# Patient Record
Sex: MALE | Race: WHITE | Employment: OTHER | URBAN - METROPOLITAN AREA
[De-identification: names, ages, dates, MRNs, and addresses within clinical notes are randomized per-mention and may not be internally consistent; named-entity substitution may affect disease eponyms.]

---

## 2017-01-13 PROBLEM — I48.92 ATRIAL FLUTTER, PAROXYSMAL (HCC): Status: ACTIVE | Noted: 2017-01-13

## 2017-04-10 PROBLEM — E78.00 HYPERCHOLESTEROLEMIA: Status: ACTIVE | Noted: 2017-04-10

## 2017-04-10 PROBLEM — I10 ESSENTIAL HYPERTENSION: Status: ACTIVE | Noted: 2017-04-10

## 2017-04-10 PROCEDURE — 36415 COLL VENOUS BLD VENIPUNCTURE: CPT | Performed by: INTERNAL MEDICINE

## 2017-04-10 PROCEDURE — 85025 COMPLETE CBC W/AUTO DIFF WBC: CPT | Performed by: INTERNAL MEDICINE

## 2017-10-12 PROBLEM — E55.9 VITAMIN D DEFICIENCY: Status: ACTIVE | Noted: 2017-10-12

## 2017-12-14 PROCEDURE — 81003 URINALYSIS AUTO W/O SCOPE: CPT | Performed by: INTERNAL MEDICINE

## 2018-05-03 ENCOUNTER — HOSPITAL ENCOUNTER (OUTPATIENT)
Dept: INTERVENTIONAL RADIOLOGY/VASCULAR | Facility: HOSPITAL | Age: 63
Discharge: HOME | End: 2018-05-03
Attending: INTERNAL MEDICINE | Admitting: INTERNAL MEDICINE
Payer: COMMERCIAL

## 2018-05-03 VITALS
SYSTOLIC BLOOD PRESSURE: 108 MMHG | DIASTOLIC BLOOD PRESSURE: 81 MMHG | WEIGHT: 184 LBS | HEIGHT: 70 IN | BODY MASS INDEX: 26.34 KG/M2 | RESPIRATION RATE: 15 BRPM | HEART RATE: 58 BPM | OXYGEN SATURATION: 94 %

## 2018-05-03 DIAGNOSIS — Z01.810 PRE-OPERATIVE CARDIOVASCULAR EXAMINATION: ICD-10-CM

## 2018-05-03 DIAGNOSIS — I35.0 AORTIC VALVE STENOSIS: ICD-10-CM

## 2018-05-03 PROCEDURE — B2111ZZ FLUOROSCOPY OF MULTIPLE CORONARY ARTERIES USING LOW OSMOLAR CONTRAST: ICD-10-PCS | Performed by: INTERNAL MEDICINE

## 2018-05-03 PROCEDURE — 80048 BASIC METABOLIC PNL TOTAL CA: CPT | Performed by: INTERNAL MEDICINE

## 2018-05-03 PROCEDURE — B2151ZZ FLUOROSCOPY OF LEFT HEART USING LOW OSMOLAR CONTRAST: ICD-10-PCS | Performed by: INTERNAL MEDICINE

## 2018-05-03 PROCEDURE — 85027 COMPLETE CBC AUTOMATED: CPT | Performed by: INTERNAL MEDICINE

## 2018-05-03 PROCEDURE — 99152 MOD SED SAME PHYS/QHP 5/>YRS: CPT

## 2018-05-03 PROCEDURE — 93460 R&L HRT ART/VENTRICLE ANGIO: CPT

## 2018-05-03 PROCEDURE — B2141ZZ FLUOROSCOPY OF RIGHT HEART USING LOW OSMOLAR CONTRAST: ICD-10-PCS | Performed by: INTERNAL MEDICINE

## 2018-05-03 PROCEDURE — 4A023N8 MEASUREMENT OF CARDIAC SAMPLING AND PRESSURE, BILATERAL, PERCUTANEOUS APPROACH: ICD-10-PCS | Performed by: INTERNAL MEDICINE

## 2018-05-03 RX ORDER — MIDAZOLAM HYDROCHLORIDE 1 MG/ML
INJECTION INTRAMUSCULAR; INTRAVENOUS
Status: COMPLETED
Start: 2018-05-03 | End: 2018-05-03

## 2018-05-03 RX ORDER — SODIUM CHLORIDE 9 MG/ML
INJECTION, SOLUTION INTRAVENOUS CONTINUOUS
Status: DISCONTINUED | OUTPATIENT
Start: 2018-05-03 | End: 2018-05-03

## 2018-05-03 RX ORDER — SODIUM CHLORIDE 9 MG/ML
INJECTION, SOLUTION INTRAVENOUS
Status: DISCONTINUED
Start: 2018-05-03 | End: 2018-05-03

## 2018-05-03 RX ADMIN — SODIUM CHLORIDE: 9 INJECTION, SOLUTION INTRAVENOUS at 09:09:00

## 2018-05-03 NOTE — DISCHARGE SUMMARY
Admitted for elective right/left heart cath for aortic stenosis. Severe AS, mild pulm HTN, normal coronaries. Follow-up with Dr Mary Jo Larkin and CV surgery.

## 2018-05-07 ENCOUNTER — ANESTHESIA EVENT (OUTPATIENT)
Dept: SURGERY | Facility: HOSPITAL | Age: 63
DRG: 219 | End: 2018-05-07
Payer: COMMERCIAL

## 2018-05-07 ENCOUNTER — APPOINTMENT (OUTPATIENT)
Dept: LAB | Facility: HOSPITAL | Age: 63
End: 2018-05-07
Payer: COMMERCIAL

## 2018-05-07 NOTE — PROGRESS NOTES
Met with patient and wife for pre-admission testing for AVR Monday 5/14/18 with Dr. Kiersten Ramirez; teaching completed; consents signed, discussed STS score  Mortality . 799% morbidity and mortality 8.748%.

## 2018-05-07 NOTE — ANESTHESIA PREPROCEDURE EVALUATION
Anesthesia PreOp Note    HPI:     Devoria Shock is a 58year old male who presents for preoperative consultation requested by: Brittny Krishnamurthy MD    Date of Surgery: 5/14/2018    Procedure(s):   HEART AORTIC VALVE REPAIR/REPLACEMENT  Indication: Aortic Current Facility-Administered Medications Ordered in Epic:  [START ON 5/14/2018] metoprolol Tartrate (LOPRESSOR) tab 25 mg 25 mg Oral Once Bart Mullins NP     Current Outpatient Prescriptions Ordered in Epic:  aspirin 81 MG Oral Tab Take 1 tablet (81 HCT 42.1 05/07/2018   MCV 94.9 05/07/2018   MCH 33.2 (H) 05/07/2018   MCHC 35.0 05/07/2018   RDW 13.0 05/07/2018    05/07/2018   MPV 9.6 05/07/2018       Lab Results  Component Value Date    05/07/2018   K 4.3 05/07/2018    05/07/2018 5. Right ventricle: Estimation of the right ventricular systolic pressure is mildly increased. RV systolic pressure (S, est): 36mm Hg. 6. Right atrium: The atrium is mildly dilated. 7. Tricuspid valve: There is mild regurgitation.   Impressions: Tawny iraheta I have informed Susanantony Costa  of the nature of the anesthetic plan, benefits, risks, major complications, and any alternative forms of anesthetic management. All of the patient's questions were answered to the best of my ability.  The patient desires th

## 2018-05-10 NOTE — CM/SW NOTE
LORAINE met with the pt for cardiac preadmission, procedure 5/14. Address and phone confirmed as listed on the facesheet. Pt lives with his wife in a 2 story house. Prior to admission, pt is ambulatory without device, uses a cpap at night.  Pt is aware to magalis

## 2018-05-14 ENCOUNTER — HOSPITAL ENCOUNTER (INPATIENT)
Facility: HOSPITAL | Age: 63
LOS: 5 days | Discharge: HOME HEALTH | DRG: 219 | End: 2018-05-19
Attending: THORACIC SURGERY (CARDIOTHORACIC VASCULAR SURGERY) | Admitting: HOSPITALIST
Payer: COMMERCIAL

## 2018-05-14 ENCOUNTER — APPOINTMENT (OUTPATIENT)
Dept: GENERAL RADIOLOGY | Facility: HOSPITAL | Age: 63
DRG: 219 | End: 2018-05-14
Attending: CLINICAL NURSE SPECIALIST
Payer: COMMERCIAL

## 2018-05-14 ENCOUNTER — ANESTHESIA (OUTPATIENT)
Dept: SURGERY | Facility: HOSPITAL | Age: 63
DRG: 219 | End: 2018-05-14
Payer: COMMERCIAL

## 2018-05-14 DIAGNOSIS — I35.0 AORTIC VALVE STENOSIS, SEVERE: ICD-10-CM

## 2018-05-14 PROBLEM — G47.00 INSOMNIA: Status: ACTIVE | Noted: 2018-05-14

## 2018-05-14 PROCEDURE — 02RF08Z REPLACEMENT OF AORTIC VALVE WITH ZOOPLASTIC TISSUE, OPEN APPROACH: ICD-10-PCS | Performed by: THORACIC SURGERY (CARDIOTHORACIC VASCULAR SURGERY)

## 2018-05-14 PROCEDURE — 5A09357 ASSISTANCE WITH RESPIRATORY VENTILATION, LESS THAN 24 CONSECUTIVE HOURS, CONTINUOUS POSITIVE AIRWAY PRESSURE: ICD-10-PCS | Performed by: HOSPITALIST

## 2018-05-14 PROCEDURE — 30233H1 TRANSFUSION OF NONAUTOLOGOUS WHOLE BLOOD INTO PERIPHERAL VEIN, PERCUTANEOUS APPROACH: ICD-10-PCS | Performed by: HOSPITALIST

## 2018-05-14 PROCEDURE — 02L70CK OCCLUSION OF LEFT ATRIAL APPENDAGE WITH EXTRALUMINAL DEVICE, OPEN APPROACH: ICD-10-PCS | Performed by: THORACIC SURGERY (CARDIOTHORACIC VASCULAR SURGERY)

## 2018-05-14 PROCEDURE — B246ZZ4 ULTRASONOGRAPHY OF RIGHT AND LEFT HEART, TRANSESOPHAGEAL: ICD-10-PCS | Performed by: THORACIC SURGERY (CARDIOTHORACIC VASCULAR SURGERY)

## 2018-05-14 PROCEDURE — 71045 X-RAY EXAM CHEST 1 VIEW: CPT | Performed by: CLINICAL NURSE SPECIALIST

## 2018-05-14 PROCEDURE — P9045 ALBUMIN (HUMAN), 5%, 250 ML: HCPCS | Performed by: ANESTHESIOLOGY

## 2018-05-14 PROCEDURE — 93312 ECHO TRANSESOPHAGEAL: CPT | Performed by: ANESTHESIOLOGY

## 2018-05-14 DEVICE — DEVICE ATRICLIP 40: Type: IMPLANTABLE DEVICE | Site: HEART | Status: FUNCTIONAL

## 2018-05-14 DEVICE — VALVE AORTIC MAGNA EASE 25MM: Type: IMPLANTABLE DEVICE | Site: HEART | Status: FUNCTIONAL

## 2018-05-14 RX ORDER — TRAZODONE HYDROCHLORIDE 50 MG/1
50 TABLET ORAL NIGHTLY PRN
Status: DISCONTINUED | OUTPATIENT
Start: 2018-05-14 | End: 2018-05-19

## 2018-05-14 RX ORDER — MORPHINE SULFATE 4 MG/ML
8 INJECTION, SOLUTION INTRAMUSCULAR; INTRAVENOUS EVERY 2 HOUR PRN
Status: DISCONTINUED | OUTPATIENT
Start: 2018-05-14 | End: 2018-05-19

## 2018-05-14 RX ORDER — SODIUM CHLORIDE 9 MG/ML
83 INJECTION, SOLUTION INTRAVENOUS CONTINUOUS
Status: DISCONTINUED | OUTPATIENT
Start: 2018-05-14 | End: 2018-05-14

## 2018-05-14 RX ORDER — ASCORBIC ACID 500 MG
1000 TABLET ORAL ONCE
Status: COMPLETED | OUTPATIENT
Start: 2018-05-14 | End: 2018-05-14

## 2018-05-14 RX ORDER — SODIUM CHLORIDE 9 MG/ML
INJECTION, SOLUTION INTRAVENOUS CONTINUOUS
Status: DISCONTINUED | OUTPATIENT
Start: 2018-05-14 | End: 2018-05-14

## 2018-05-14 RX ORDER — FAMOTIDINE 20 MG/1
20 TABLET ORAL ONCE
Status: COMPLETED | OUTPATIENT
Start: 2018-05-14 | End: 2018-05-14

## 2018-05-14 RX ORDER — HEPARIN SODIUM 1000 [USP'U]/ML
INJECTION, SOLUTION INTRAVENOUS; SUBCUTANEOUS AS NEEDED
Status: DISCONTINUED | OUTPATIENT
Start: 2018-05-14 | End: 2018-05-14 | Stop reason: HOSPADM

## 2018-05-14 RX ORDER — ENOXAPARIN SODIUM 100 MG/ML
40 INJECTION SUBCUTANEOUS DAILY
Status: DISCONTINUED | OUTPATIENT
Start: 2018-05-15 | End: 2018-05-15

## 2018-05-14 RX ORDER — MORPHINE SULFATE 4 MG/ML
4 INJECTION, SOLUTION INTRAMUSCULAR; INTRAVENOUS EVERY 2 HOUR PRN
Status: DISCONTINUED | OUTPATIENT
Start: 2018-05-14 | End: 2018-05-19

## 2018-05-14 RX ORDER — CHLORHEXIDINE GLUCONATE 0.12 MG/ML
15 RINSE ORAL 2 TIMES DAILY
Status: DISCONTINUED | OUTPATIENT
Start: 2018-05-14 | End: 2018-05-19

## 2018-05-14 RX ORDER — ASPIRIN 325 MG
325 TABLET, DELAYED RELEASE (ENTERIC COATED) ORAL DAILY
Status: DISCONTINUED | OUTPATIENT
Start: 2018-05-15 | End: 2018-05-18

## 2018-05-14 RX ORDER — MORPHINE SULFATE 2 MG/ML
2 INJECTION, SOLUTION INTRAMUSCULAR; INTRAVENOUS EVERY 2 HOUR PRN
Status: DISCONTINUED | OUTPATIENT
Start: 2018-05-14 | End: 2018-05-19

## 2018-05-14 RX ORDER — GLYCOPYRROLATE 0.2 MG/ML
0.8 INJECTION, SOLUTION INTRAMUSCULAR; INTRAVENOUS ONCE AS NEEDED
Status: COMPLETED | OUTPATIENT
Start: 2018-05-14 | End: 2018-05-14

## 2018-05-14 RX ORDER — SENNA AND DOCUSATE SODIUM 50; 8.6 MG/1; MG/1
2 TABLET, FILM COATED ORAL 2 TIMES DAILY
Status: DISCONTINUED | OUTPATIENT
Start: 2018-05-15 | End: 2018-05-18

## 2018-05-14 RX ORDER — ACETAMINOPHEN 325 MG/1
650 TABLET ORAL EVERY 4 HOURS PRN
Status: DISCONTINUED | OUTPATIENT
Start: 2018-05-14 | End: 2018-05-15

## 2018-05-14 RX ORDER — ONDANSETRON 2 MG/ML
4 INJECTION INTRAMUSCULAR; INTRAVENOUS EVERY 6 HOURS PRN
Status: DISCONTINUED | OUTPATIENT
Start: 2018-05-14 | End: 2018-05-19

## 2018-05-14 RX ORDER — METOCLOPRAMIDE 10 MG/1
10 TABLET ORAL ONCE
Status: COMPLETED | OUTPATIENT
Start: 2018-05-14 | End: 2018-05-14

## 2018-05-14 RX ORDER — LORAZEPAM 1 MG/1
1 TABLET ORAL ONCE
Status: COMPLETED | OUTPATIENT
Start: 2018-05-14 | End: 2018-05-14

## 2018-05-14 RX ORDER — SODIUM PHOSPHATE, DIBASIC AND SODIUM PHOSPHATE, MONOBASIC 7; 19 G/133ML; G/133ML
1 ENEMA RECTAL ONCE AS NEEDED
Status: DISCONTINUED | OUTPATIENT
Start: 2018-05-14 | End: 2018-05-19

## 2018-05-14 RX ORDER — CEFAZOLIN SODIUM/WATER 2 G/20 ML
2 SYRINGE (ML) INTRAVENOUS
Status: COMPLETED | OUTPATIENT
Start: 2018-05-14 | End: 2018-05-14

## 2018-05-14 RX ORDER — SODIUM CHLORIDE 9 MG/ML
INJECTION, SOLUTION INTRAVENOUS CONTINUOUS
Status: DISCONTINUED | OUTPATIENT
Start: 2018-05-14 | End: 2018-05-19

## 2018-05-14 RX ORDER — POLYETHYLENE GLYCOL 3350 17 G/17G
17 POWDER, FOR SOLUTION ORAL DAILY PRN
Status: DISCONTINUED | OUTPATIENT
Start: 2018-05-14 | End: 2018-05-19

## 2018-05-14 RX ORDER — ALBUMIN, HUMAN INJ 5% 5 %
SOLUTION INTRAVENOUS
Status: COMPLETED
Start: 2018-05-14 | End: 2018-05-14

## 2018-05-14 RX ORDER — MIDAZOLAM HYDROCHLORIDE 1 MG/ML
INJECTION INTRAMUSCULAR; INTRAVENOUS AS NEEDED
Status: DISCONTINUED | OUTPATIENT
Start: 2018-05-14 | End: 2018-05-14 | Stop reason: SURG

## 2018-05-14 RX ORDER — ASCORBIC ACID 500 MG
500 TABLET ORAL 3 TIMES DAILY
Status: DISCONTINUED | OUTPATIENT
Start: 2018-05-15 | End: 2018-05-19

## 2018-05-14 RX ORDER — PROTAMINE SULFATE 10 MG/ML
INJECTION, SOLUTION INTRAVENOUS AS NEEDED
Status: DISCONTINUED | OUTPATIENT
Start: 2018-05-14 | End: 2018-05-14 | Stop reason: SURG

## 2018-05-14 RX ORDER — POTASSIUM CHLORIDE 14.9 MG/ML
20 INJECTION INTRAVENOUS AS NEEDED
Status: DISCONTINUED | OUTPATIENT
Start: 2018-05-14 | End: 2018-05-19

## 2018-05-14 RX ORDER — MAGNESIUM SULFATE 1 G/100ML
1 INJECTION INTRAVENOUS AS NEEDED
Status: DISCONTINUED | OUTPATIENT
Start: 2018-05-14 | End: 2018-05-19

## 2018-05-14 RX ORDER — ALBUMIN, HUMAN INJ 5% 5 %
250 SOLUTION INTRAVENOUS ONCE AS NEEDED
Status: COMPLETED | OUTPATIENT
Start: 2018-05-14 | End: 2018-05-14

## 2018-05-14 RX ORDER — SUFENTANIL CITRATE 50 UG/ML
INJECTION EPIDURAL; INTRAVENOUS AS NEEDED
Status: DISCONTINUED | OUTPATIENT
Start: 2018-05-14 | End: 2018-05-14 | Stop reason: SURG

## 2018-05-14 RX ORDER — HYDROCODONE BITARTRATE AND ACETAMINOPHEN 5; 325 MG/1; MG/1
1 TABLET ORAL EVERY 4 HOURS PRN
Status: DISCONTINUED | OUTPATIENT
Start: 2018-05-14 | End: 2018-05-15

## 2018-05-14 RX ORDER — HYDROCODONE BITARTRATE AND ACETAMINOPHEN 5; 325 MG/1; MG/1
2 TABLET ORAL EVERY 4 HOURS PRN
Status: DISCONTINUED | OUTPATIENT
Start: 2018-05-14 | End: 2018-05-15

## 2018-05-14 RX ORDER — CEFAZOLIN SODIUM 1 G/3ML
INJECTION, POWDER, FOR SOLUTION INTRAMUSCULAR; INTRAVENOUS AS NEEDED
Status: DISCONTINUED | OUTPATIENT
Start: 2018-05-14 | End: 2018-05-14 | Stop reason: HOSPADM

## 2018-05-14 RX ORDER — POTASSIUM CHLORIDE 29.8 MG/ML
40 INJECTION INTRAVENOUS AS NEEDED
Status: DISCONTINUED | OUTPATIENT
Start: 2018-05-14 | End: 2018-05-19

## 2018-05-14 RX ORDER — METOCLOPRAMIDE HYDROCHLORIDE 5 MG/ML
10 INJECTION INTRAMUSCULAR; INTRAVENOUS EVERY 6 HOURS
Status: DISCONTINUED | OUTPATIENT
Start: 2018-05-14 | End: 2018-05-17

## 2018-05-14 RX ORDER — VECURONIUM BROMIDE 1 MG/ML
INJECTION, POWDER, LYOPHILIZED, FOR SOLUTION INTRAVENOUS AS NEEDED
Status: DISCONTINUED | OUTPATIENT
Start: 2018-05-14 | End: 2018-05-14 | Stop reason: SURG

## 2018-05-14 RX ORDER — LIDOCAINE HYDROCHLORIDE 10 MG/ML
INJECTION, SOLUTION EPIDURAL; INFILTRATION; INTRACAUDAL; PERINEURAL AS NEEDED
Status: DISCONTINUED | OUTPATIENT
Start: 2018-05-14 | End: 2018-05-14 | Stop reason: SURG

## 2018-05-14 RX ORDER — BISACODYL 10 MG
10 SUPPOSITORY, RECTAL RECTAL
Status: DISCONTINUED | OUTPATIENT
Start: 2018-05-14 | End: 2018-05-19

## 2018-05-14 RX ORDER — MILRINONE LACTATE 0.2 MG/ML
0.38 INJECTION, SOLUTION INTRAVENOUS AS NEEDED
Status: DISCONTINUED | OUTPATIENT
Start: 2018-05-14 | End: 2018-05-19

## 2018-05-14 RX ORDER — DOBUTAMINE HYDROCHLORIDE 100 MG/100ML
INJECTION INTRAVENOUS CONTINUOUS PRN
Status: DISCONTINUED | OUTPATIENT
Start: 2018-05-14 | End: 2018-05-14 | Stop reason: SURG

## 2018-05-14 RX ORDER — ACETAMINOPHEN 10 MG/ML
1000 INJECTION, SOLUTION INTRAVENOUS EVERY 8 HOURS
Status: COMPLETED | OUTPATIENT
Start: 2018-05-14 | End: 2018-05-15

## 2018-05-14 RX ORDER — LIDOCAINE HYDROCHLORIDE 20 MG/ML
INJECTION, SOLUTION EPIDURAL; INFILTRATION; INTRACAUDAL; PERINEURAL AS NEEDED
Status: DISCONTINUED | OUTPATIENT
Start: 2018-05-14 | End: 2018-05-14 | Stop reason: SURG

## 2018-05-14 RX ORDER — DOXEPIN HYDROCHLORIDE 50 MG/1
1 CAPSULE ORAL DAILY
Status: DISCONTINUED | OUTPATIENT
Start: 2018-05-15 | End: 2018-05-19

## 2018-05-14 RX ORDER — NITROGLYCERIN 20 MG/100ML
INJECTION INTRAVENOUS CONTINUOUS PRN
Status: DISCONTINUED | OUTPATIENT
Start: 2018-05-14 | End: 2018-05-19

## 2018-05-14 RX ORDER — NEOSTIGMINE METHYLSULFATE 1 MG/ML
4 INJECTION INTRAVENOUS ONCE AS NEEDED
Status: COMPLETED | OUTPATIENT
Start: 2018-05-14 | End: 2018-05-14

## 2018-05-14 RX ORDER — AMIODARONE HYDROCHLORIDE 200 MG/1
200 TABLET ORAL
Status: DISCONTINUED | OUTPATIENT
Start: 2018-05-14 | End: 2018-05-19

## 2018-05-14 RX ORDER — MAGNESIUM SULFATE HEPTAHYDRATE 500 MG/ML
INJECTION, SOLUTION INTRAMUSCULAR; INTRAVENOUS AS NEEDED
Status: DISCONTINUED | OUTPATIENT
Start: 2018-05-14 | End: 2018-05-14 | Stop reason: SURG

## 2018-05-14 RX ORDER — GLYCOPYRROLATE 0.2 MG/ML
INJECTION INTRAMUSCULAR; INTRAVENOUS AS NEEDED
Status: DISCONTINUED | OUTPATIENT
Start: 2018-05-14 | End: 2018-05-14 | Stop reason: SURG

## 2018-05-14 RX ORDER — MAGNESIUM HYDROXIDE 1200 MG/15ML
LIQUID ORAL CONTINUOUS PRN
Status: DISCONTINUED | OUTPATIENT
Start: 2018-05-14 | End: 2018-05-19

## 2018-05-14 RX ORDER — CEFAZOLIN SODIUM/WATER 2 G/20 ML
2 SYRINGE (ML) INTRAVENOUS EVERY 8 HOURS
Status: COMPLETED | OUTPATIENT
Start: 2018-05-14 | End: 2018-05-15

## 2018-05-14 RX ORDER — SODIUM CHLORIDE 0.9 % (FLUSH) 0.9 %
10 SYRINGE (ML) INJECTION AS NEEDED
Status: DISCONTINUED | OUTPATIENT
Start: 2018-05-14 | End: 2018-05-19

## 2018-05-14 RX ORDER — DOCUSATE SODIUM 100 MG/1
100 CAPSULE, LIQUID FILLED ORAL 2 TIMES DAILY
Status: DISCONTINUED | OUTPATIENT
Start: 2018-05-14 | End: 2018-05-19

## 2018-05-14 RX ORDER — ALBUMIN, HUMAN INJ 5% 5 %
250 SOLUTION INTRAVENOUS ONCE
Status: DISCONTINUED | OUTPATIENT
Start: 2018-05-14 | End: 2018-05-19

## 2018-05-14 RX ORDER — ALBUMIN, HUMAN INJ 5% 5 %
SOLUTION INTRAVENOUS CONTINUOUS PRN
Status: DISCONTINUED | OUTPATIENT
Start: 2018-05-14 | End: 2018-05-14 | Stop reason: SURG

## 2018-05-14 RX ORDER — TEMAZEPAM 15 MG/1
15 CAPSULE ORAL NIGHTLY PRN
Status: DISCONTINUED | OUTPATIENT
Start: 2018-05-14 | End: 2018-05-19

## 2018-05-14 RX ORDER — DEXTROSE, SODIUM CHLORIDE, SODIUM LACTATE, POTASSIUM CHLORIDE, AND CALCIUM CHLORIDE 5; .6; .31; .03; .02 G/100ML; G/100ML; G/100ML; G/100ML; G/100ML
INJECTION, SOLUTION INTRAVENOUS CONTINUOUS
Status: DISCONTINUED | OUTPATIENT
Start: 2018-05-14 | End: 2018-05-15

## 2018-05-14 RX ORDER — FAMOTIDINE 10 MG/ML
20 INJECTION, SOLUTION INTRAVENOUS 2 TIMES DAILY
Status: DISCONTINUED | OUTPATIENT
Start: 2018-05-14 | End: 2018-05-19

## 2018-05-14 RX ORDER — FAMOTIDINE 20 MG/1
20 TABLET ORAL 2 TIMES DAILY
Status: DISCONTINUED | OUTPATIENT
Start: 2018-05-14 | End: 2018-05-19

## 2018-05-14 RX ORDER — MAGNESIUM SULFATE HEPTAHYDRATE 40 MG/ML
2 INJECTION, SOLUTION INTRAVENOUS AS NEEDED
Status: DISCONTINUED | OUTPATIENT
Start: 2018-05-14 | End: 2018-05-19

## 2018-05-14 RX ORDER — SODIUM CHLORIDE 9 MG/ML
INJECTION, SOLUTION INTRAVENOUS CONTINUOUS PRN
Status: DISCONTINUED | OUTPATIENT
Start: 2018-05-14 | End: 2018-05-14 | Stop reason: SURG

## 2018-05-14 RX ORDER — PHENYLEPHRINE HCL 10 MG/ML
VIAL (ML) INJECTION AS NEEDED
Status: DISCONTINUED | OUTPATIENT
Start: 2018-05-14 | End: 2018-05-14 | Stop reason: SURG

## 2018-05-14 RX ADMIN — GLYCOPYRROLATE 0.2 MG: 0.2 INJECTION INTRAMUSCULAR; INTRAVENOUS at 07:27:00

## 2018-05-14 RX ADMIN — SODIUM CHLORIDE: 9 INJECTION, SOLUTION INTRAVENOUS at 07:00:00

## 2018-05-14 RX ADMIN — PROTAMINE SULFATE 50 MG: 10 INJECTION, SOLUTION INTRAVENOUS at 11:20:00

## 2018-05-14 RX ADMIN — DOBUTAMINE HYDROCHLORIDE 5 MCG/KG/MIN: 100 INJECTION INTRAVENOUS at 10:00:00

## 2018-05-14 RX ADMIN — PHENYLEPHRINE HCL 100 MCG: 10 MG/ML VIAL (ML) INJECTION at 07:22:00

## 2018-05-14 RX ADMIN — LIDOCAINE HYDROCHLORIDE 50 MG: 10 INJECTION, SOLUTION EPIDURAL; INFILTRATION; INTRACAUDAL; PERINEURAL at 07:08:00

## 2018-05-14 RX ADMIN — SODIUM CHLORIDE: 9 INJECTION, SOLUTION INTRAVENOUS at 08:10:00

## 2018-05-14 RX ADMIN — MIDAZOLAM HYDROCHLORIDE 5 MG: 1 INJECTION INTRAMUSCULAR; INTRAVENOUS at 08:50:00

## 2018-05-14 RX ADMIN — MIDAZOLAM HYDROCHLORIDE 2 MG: 1 INJECTION INTRAMUSCULAR; INTRAVENOUS at 07:00:00

## 2018-05-14 RX ADMIN — DOBUTAMINE HYDROCHLORIDE 7 MCG/KG/MIN: 100 INJECTION INTRAVENOUS at 07:41:00

## 2018-05-14 RX ADMIN — VECURONIUM BROMIDE 10 MG: 1 INJECTION, POWDER, LYOPHILIZED, FOR SOLUTION INTRAVENOUS at 10:29:00

## 2018-05-14 RX ADMIN — PHENYLEPHRINE HCL 100 MCG: 10 MG/ML VIAL (ML) INJECTION at 07:17:00

## 2018-05-14 RX ADMIN — SODIUM CHLORIDE: 9 INJECTION, SOLUTION INTRAVENOUS at 11:35:00

## 2018-05-14 RX ADMIN — PHENYLEPHRINE HCL 100 MCG: 10 MG/ML VIAL (ML) INJECTION at 07:31:00

## 2018-05-14 RX ADMIN — VECURONIUM BROMIDE 10 MG: 1 INJECTION, POWDER, LYOPHILIZED, FOR SOLUTION INTRAVENOUS at 07:11:00

## 2018-05-14 RX ADMIN — MAGNESIUM SULFATE HEPTAHYDRATE 2 G: 500 INJECTION, SOLUTION INTRAMUSCULAR; INTRAVENOUS at 10:21:00

## 2018-05-14 RX ADMIN — DOBUTAMINE HYDROCHLORIDE 4 MCG/KG/MIN: 100 INJECTION INTRAVENOUS at 11:10:00

## 2018-05-14 RX ADMIN — DOBUTAMINE HYDROCHLORIDE 5 MCG/KG/MIN: 100 INJECTION INTRAVENOUS at 07:35:00

## 2018-05-14 RX ADMIN — PHENYLEPHRINE HCL 200 MCG: 10 MG/ML VIAL (ML) INJECTION at 07:29:00

## 2018-05-14 RX ADMIN — PHENYLEPHRINE HCL 100 MCG: 10 MG/ML VIAL (ML) INJECTION at 07:40:00

## 2018-05-14 RX ADMIN — PHENYLEPHRINE HCL 100 MCG: 10 MG/ML VIAL (ML) INJECTION at 07:12:00

## 2018-05-14 RX ADMIN — MIDAZOLAM HYDROCHLORIDE 5 MG: 1 INJECTION INTRAMUSCULAR; INTRAVENOUS at 07:08:00

## 2018-05-14 RX ADMIN — LIDOCAINE HYDROCHLORIDE 100 MG: 20 INJECTION, SOLUTION EPIDURAL; INFILTRATION; INTRACAUDAL; PERINEURAL at 10:20:00

## 2018-05-14 RX ADMIN — SUFENTANIL CITRATE 50 MCG: 50 INJECTION EPIDURAL; INTRAVENOUS at 07:08:00

## 2018-05-14 RX ADMIN — Medication 0.5 MG: at 07:42:00

## 2018-05-14 RX ADMIN — ALBUMIN, HUMAN INJ 5%: 5 SOLUTION INTRAVENOUS at 08:23:00

## 2018-05-14 RX ADMIN — CEFAZOLIN SODIUM/WATER 2 G: 2 G/20 ML SYRINGE (ML) INTRAVENOUS at 07:35:00

## 2018-05-14 RX ADMIN — PHENYLEPHRINE HCL 100 MCG: 10 MG/ML VIAL (ML) INJECTION at 07:25:00

## 2018-05-14 RX ADMIN — ALBUMIN, HUMAN INJ 5%: 5 SOLUTION INTRAVENOUS at 08:21:00

## 2018-05-14 RX ADMIN — SUFENTANIL CITRATE 50 MCG: 50 INJECTION EPIDURAL; INTRAVENOUS at 07:07:00

## 2018-05-14 RX ADMIN — PHENYLEPHRINE HCL 100 MCG: 10 MG/ML VIAL (ML) INJECTION at 07:15:00

## 2018-05-14 RX ADMIN — PHENYLEPHRINE HCL 100 MCG: 10 MG/ML VIAL (ML) INJECTION at 07:35:00

## 2018-05-14 RX ADMIN — SODIUM CHLORIDE: 9 INJECTION, SOLUTION INTRAVENOUS at 11:36:00

## 2018-05-14 RX ADMIN — GLYCOPYRROLATE 0.2 MG: 0.2 INJECTION INTRAMUSCULAR; INTRAVENOUS at 07:08:00

## 2018-05-14 RX ADMIN — SODIUM CHLORIDE: 9 INJECTION, SOLUTION INTRAVENOUS at 08:26:00

## 2018-05-14 RX ADMIN — PROTAMINE SULFATE 500 MG: 10 INJECTION, SOLUTION INTRAVENOUS at 10:40:00

## 2018-05-14 NOTE — ANESTHESIA PROCEDURE NOTES
Arterial Line  Performed by: Lukas Payan  Authorized by: Lukas Payan     Procedure Start:  5/14/2018 7:00 AM  Procedure End:  5/14/2018 7:05 AM  Site Identification: surface landmarks    Patient Location:  OR  Indication: continuous blood press

## 2018-05-14 NOTE — PLAN OF CARE
Problem: Patient Centered Care  Goal: Patient preferences are identified and integrated in the patient's plan of care  Interventions:  - What would you like us to know as we care for you?  Patient's son is a physician  - Provide timely, complete, and accura ordered  - Initiate emergency measures for life threatening arrhythmias  - Monitor electrolytes and administer replacement therapy as ordered  Outcome: Progressing      Problem: SAFETY ADULT - FALL  Goal: Free from fall injury  INTERVENTIONS:  - Assess pt if applicable  - Encourage broncho-pulmonary hygiene including cough, deep breathe, Incentive Spirometry  - Assess the need for suctioning and perform as needed  - Assess and instruct to report SOB or any respiratory difficulty  - Respiratory Therapy suppo

## 2018-05-14 NOTE — OR PREOP
Spoke with Dr Lavelle Hines regarding patient's history and physical that needs to be done before surgery. He states he will take care of it.   Called at 6:15

## 2018-05-14 NOTE — ANESTHESIA POSTPROCEDURE EVALUATION
Patient: Prudence Navarro    Procedure Summary     Date:  05/14/18 Room / Location:  [facility] OR  / [facility] OR    Anesthesia Start:  9794 Anesthesia Stop:  4275    Procedure:  HEART AORTIC VALVE REPAIR/REPLACEMENT (N/A ) Diagnosis:       Aortic valve sten

## 2018-05-14 NOTE — ANESTHESIA PROCEDURE NOTES
Procedure Performed: ALDEN       Start Time:  5/14/2018 7:30 AM       End Time:   5/14/2018 12:30 PM    Preanesthesia Checklist:  Patient identified, IV assessed, risks and benefits discussed, monitors and equipment assessed, procedure being performed at [facility]

## 2018-05-14 NOTE — H&P
H&P  Encounter Date: 2018  Ramiro Clarke MD   SURGERY, CARDIOVASCULAR      []Manual[]Template  []Copied  [facility]     Report of Consultation     Andrea Patterson      1955 MRN [id number]   Referring Provider Mary How 9/2/2015: COLONOSCOPY,BIOPSY N/A      Comment: Procedure: COLONOSCOPY, POSSIBLE BIOPSY,                POSSIBLE POLYPECTOMY 40743;  Surgeon: Michelle Stewart MD;  Location: [address] [place] [address] date: VASECTOMY     Family History General appearance: alert, appears stated age, cooperative and no distress  Head: Normocephalic, without obvious abnormality, atraumatic  Neck: + systolic murmur, no adenopathy, no JVD, supple, symmetrical, trachea midline and thyroid not enlarged, symmetr Aortic valve:   Trileaflet; severely calcified leaflets. The highest aortic  velocity is obtained from the apical window.  Doppler:   There is severe  stenosis.   There is trace regurgitation.   Mitral valve:   Structurally normal valve.    Doppler: Amanda Brandt The patient has severe aortic stenosis by echo Doppler. He probably has only mild symptoms in the way of limiting physical activity. Recommendations are for aortic valve replacement. He has completed his work up.   I discussed the indications, techniques

## 2018-05-14 NOTE — CONSULTS
Critical Care Consult     Assessment / Plan:  1. AS s/p AVR  - wean FiO2  - extubate per protocol  - wean drips as able  - chest tube in place  - per CTS and cardiology  2. Afib  - on IV amio  - per cards  3.  ART  - CPAP whenever sleeping once extubated  4 5/13/2018 at 0500   TraZODone HCl 50 MG Oral Tab TAKE 4 BY MOUTH NIGHTLY Disp: 360 tablet Rfl: 3 5/13/2018 at 2130   Nutritional Supplements (GLUCOSAMINE FORTE OR) Take 1 tablet by mouth daily.  Disp:  Rfl:  5/3/2018 at Unknown time   Cholecalciferol (VITAM Father    • Heart Disorder Mother       age 71 myocardial infarction; had cerebral aneurysm   • No Known Problems Brother          Exam:   18  0545 18  0600 18  1210 18  1220   BP: 136/82  133/88 125/86   BP Location: Right arm

## 2018-05-14 NOTE — CM/SW NOTE
5/14CM-MD orders received in regards to discharge planning [facility]-Case Management previously noted that the Patient was agreeable to a referral to  [facility].  This Writer made a referral to  Jaren Esparza ([id number]) [facility]       MD Xin Ibrahim with Xin Ibrahim discipli

## 2018-05-14 NOTE — H&P
[facility] Hospitalist Team  History and Physical     ASSESSMENT / PLAN:   57 yo male with HTN, insomnia, ART, Paroxysmal atrial flutter/atrial fib and Severe Aortic stenosis.  Pt is S/P AVR, see below for details    S/P AVR  -5/2018 cardiac cath with nkechi ledezma se supple; no JVD; no carotid bruits   RESPIRATORY: normal expansion; non labored; CTA   CARDIOVASCULAR: regular,nl S1 S2; no murmur, no gallop; no rub, CT in place  ABDOMEN:  Soft, non distended  GENITAL/: moore  EXTREMITIES: left arterial line, right neck tablet by mouth daily. Disp:  Rfl:    Cholecalciferol (VITAMIN D3) 3000 UNITS Oral Tab Take 1 tablet by mouth daily.  Disp:  Rfl:        Soc Hx     Smoking status: Never Smoker    Smokeless tobacco: Never Used    Alcohol use Yes    Comment: elvie moses edema    Assessment/Plan    S/P AVR  -5/2018 cardiac cath with nkechi ledezma, severe AS with mean gradient 58 mmHg  -S/P Tissue AVR-await op report  -follow for expected acute blood loss anemia, pre op hgb 14.8  -wean drips as able-currently on norepi, dobutami

## 2018-05-14 NOTE — OPERATIVE REPORT
[facility] 2W/SW  Operative Note     Rolan Osman Location: OR   CSN [id number] MRN [id number]   Admission Date 5/14/2018 Operation Date 5/14/2018   Attending Physician Ramiro Kuhn MD Operating Physician Dinora Cardoza MD      Preoperat bypass and cooled to 33°C. The aorta was crossclamped and then antegrade and retrograde cardioplegia were given initially and then about every 15 minutes during the procedure keeping myocardial temperature below 15°.   The left atrial appendage was exposed over the aorta were placed and secured. Chest was closed with sternal wires in the usual fashion and soft tissues were closed with absorbable sutures.   The patient was monitored in the operating room with no bleeding from the chest tubes 5 minutes ×3 and

## 2018-05-14 NOTE — ANESTHESIA PROCEDURE NOTES
Central Line  Performed by: Leopoldo Antis  Authorized by: Leopoldo Antis     Procedure Start:  5/14/2018 7:15 AM  Procedure End:  5/14/2018 7:25 AM  Site Identification: real time ultrasound guided, surface landmarks and image stored and retrievable

## 2018-05-14 NOTE — PROGRESS NOTES
[facility] Cardiology  Progress Note    Odell Gaffney Patient Status:  Inpatient    1955 MRN [id number]   Location [facility] 2W/SW Attending Natacha Toussaint MD   Hosp Day # 0 PCP Rene Franklin MD         Impression:  1. Intravenous Continuous   sodium chloride 0.9 % irrigation   Continuous PRN   Normal Saline Flush 0.9 % injection 10 mL 10 mL Intravenous PRN   0.9%  NaCl infusion  Intravenous Continuous   temazepam (RESTORIL) cap 15 mg 15 mg Oral Nightly PRN   Albumin Hum (BACTROBAN) 2% nasal ointment OINT 1 Application 1 Application Nasal BID   Chlorhexidine Gluconate (PERIDEX) 0.12 % solution 15 mL 15 mL Mouth/Throat BID   dextrose 5 % /lactated ringers infusion  Intravenous Continuous   [START ON 5/15/2018] Enoxaparin So 05/14/2018   CO2 25 05/14/2018   BUN 17 05/14/2018   CREATSERUM 0.86 05/14/2018    05/14/2018   CA 7.8 05/14/2018   MG 2.9 05/14/2018     No results for input(s): TROP, CK in the last 168 hours.          ECG (5/14/18): AV paced        CXR 5/14/18:  C

## 2018-05-15 ENCOUNTER — APPOINTMENT (OUTPATIENT)
Dept: GENERAL RADIOLOGY | Facility: HOSPITAL | Age: 63
DRG: 219 | End: 2018-05-15
Attending: CLINICAL NURSE SPECIALIST
Payer: COMMERCIAL

## 2018-05-15 PROCEDURE — 71045 X-RAY EXAM CHEST 1 VIEW: CPT | Performed by: CLINICAL NURSE SPECIALIST

## 2018-05-15 RX ORDER — 0.9 % SODIUM CHLORIDE 0.9 %
VIAL (ML) INJECTION
Status: DISPENSED
Start: 2018-05-15 | End: 2018-05-16

## 2018-05-15 RX ORDER — ZALEPLON 10 MG/1
10 CAPSULE ORAL NIGHTLY PRN
Status: DISCONTINUED | OUTPATIENT
Start: 2018-05-15 | End: 2018-05-19

## 2018-05-15 RX ORDER — ACETAMINOPHEN AND CODEINE PHOSPHATE 300; 30 MG/1; MG/1
2 TABLET ORAL EVERY 4 HOURS PRN
Status: DISCONTINUED | OUTPATIENT
Start: 2018-05-15 | End: 2018-05-19

## 2018-05-15 RX ORDER — FUROSEMIDE 10 MG/ML
20 INJECTION INTRAMUSCULAR; INTRAVENOUS ONCE
Status: COMPLETED | OUTPATIENT
Start: 2018-05-15 | End: 2018-05-15

## 2018-05-15 RX ORDER — POTASSIUM CHLORIDE 20 MEQ/1
20 TABLET, EXTENDED RELEASE ORAL ONCE
Status: DISCONTINUED | OUTPATIENT
Start: 2018-05-15 | End: 2018-05-19

## 2018-05-15 RX ORDER — ACETAMINOPHEN AND CODEINE PHOSPHATE 300; 30 MG/1; MG/1
1 TABLET ORAL EVERY 4 HOURS PRN
Status: DISCONTINUED | OUTPATIENT
Start: 2018-05-15 | End: 2018-05-19

## 2018-05-15 RX ORDER — 0.9 % SODIUM CHLORIDE 0.9 %
VIAL (ML) INJECTION
Status: COMPLETED
Start: 2018-05-15 | End: 2018-05-15

## 2018-05-15 NOTE — PLAN OF CARE
Problem: CARDIOVASCULAR - ADULT  Goal: Maintains optimal cardiac output and hemodynamic stability  INTERVENTIONS:  - Monitor vital signs, rhythm, and trends  - Monitor for bleeding, hypotension and signs of decreased cardiac output  - Evaluate effectivenes intact  INTERVENTIONS  - Assess and document risk factors for pressure ulcer development  - Assess and document skin integrity  - Monitor for areas of redness and/or skin breakdown  - Initiate interventions, skin care algorithm/standards of care as needed

## 2018-05-15 NOTE — PROGRESS NOTES
Dann Winters Patient Status:  Inpatient    1955 MRN [id number]   Location [facility] 2W/SW Attending Deette Sacks, MD   Hosp Day # 1 PCP Harsh Euceda MD     Critical Care Progress Note      Assessment/Plan:  1. AS s/p AVR  - Mallampati II. Introducer in place   Lungs: Clear to auscultation bilaterally, no focal wheezes or crackles    Chest wall: chest tube in place, no air leak. Midline dressing not taken down   Heart: Regular rate and rhythm, normal S1S2, III/VI  murmur.    Ab

## 2018-05-15 NOTE — PROGRESS NOTES
[facility] Hospitalist Team  Progress Note    Clarissa Minus Patient Status:  Inpatient    1955 MRN [id number]   Location [facility] 2W/SW Attending Ryan Mills MD   Hosp Day # 1 PCP Jose Angel Tipton MD     CC: Follow Up  PCP: Silviano Yao breath due to pain. Hopes I don't hear a murmur today! OBJECTIVE:   Blood pressure 97/67, pulse 56, temperature 98.8 °F (37.1 °C), resp. rate 17, height 177.8 cm (5' 10\"), weight 185 lb 6.4 oz (84.1 kg), SpO2 98 %.     GENERAL: no apparent distress, o 0.9%  NaCl infusion  Intravenous Continuous   temazepam (RESTORIL) cap 15 mg 15 mg Oral Nightly PRN   DOBUTamine HCl (DOBUTREX) 250 mg in sodium chloride 0.9 % 250 mL infusion 2.5-10 mcg/kg/min Intravenous Continuous PRN   nitroGLYCERIN infusion 50mg in Q2H PRN   Or      morphINE sulfate (PF) 4 MG/ML injection 4 mg 4 mg Intravenous Q2H PRN   Or      morphINE sulfate (PF) 4 MG/ML injection 8 mg 8 mg Intravenous Q2H PRN   aspirin EC EC tab 325 mg 325 mg Oral Daily   ceFAZolin sodium (ANCEF/KEFZOL) 2 GM/20ML 26.60 kg/m²     Exam:  GEN: awake   HEENT: EOMI, PERRLA  Neck: Supple, no JVD, R sided central line  Pulm: CTAB, no increased work of breathing  CV: RRR, no murmurs   ABD: Soft, non-tender, non-distended, +BS  Neuro: Grossly normal, CN intact, sensory [place]

## 2018-05-15 NOTE — PROGRESS NOTES
[facility]    Progress Note    Dann Winters Patient Status:  Inpatient    1955 MRN [id number]   Location [facility] 2W/SW Attending Deette Sacks, MD   Hosp Day # 1 PCP Harsh Euceda MD     Subjective:  Pt.  Is e S2  Abdomen: Soft, NT/ND, BS+x4 diminished, +flatus (minimal), no Bm   Extremities: Warm, dry, no LE edema bilat  Pulses: 2+ bilat DP  Skin: sternotomy incision drsg: CDI - TPW intact  Neurological:  AAOx3, MAEW    Assessment/Plan:  S/P AVR & GEMMA CLIP POD

## 2018-05-15 NOTE — PROGRESS NOTES
Assessment and Plan:     1. Bioprosthetic AVR for AS 5/14/18 + GEMMA clip  2. Paroxysmal atrial flutter  - one episode in 3/15 while on vacation--some ETOH. No recurrence  - presently SR  3. HTN  4.  ART    PLAN:  - lines out  - amio protocol      Tita Mcodnald 5/15/2018 at 6:46     Approved by (CST): Adam Loza MD on 5/15/2018 at 6:48          Xr Chest Ap Portable  (cpt=71045)    Result Date: 5/14/2018  CONCLUSION:  Status post coronary bypass, aortic valve replacement and sternotomy.  Mildly prominent pulmon

## 2018-05-15 NOTE — DIETARY NOTE
NUTRITION:  Diet Education    Consult received for diet education per protocol. Education deferred until s/p intervention and when appropriate for teaching. Patient visited.  Nutrition care discussed/handout provided on what to except after cardiac proc

## 2018-05-16 NOTE — PROGRESS NOTES
[facility] Hospitalist Team  Progress Note    Delvis Carmona Patient Status:  Inpatient    1955 MRN [id number]   Location [facility] 2W/SW Attending Maria C Arriaga MD   Hosp Day # 2 PCP Anat Rangel MD     CC: Follow Up  PCP: Mika Donahue 68, temperature 98.9 °F (37.2 °C), temperature source Temporal, resp. rate 23, height 177.8 cm (5' 10\"), weight 185 lb (83.9 kg), SpO2 93 %.     GENERAL: no apparent distress  NEURO: A/A Ox3  RESP: non labored, CTA  CARDIO: Regular, no murmur  ABD: soft, N Saline Flush 0.9 % injection 10 mL 10 mL Intravenous PRN   0.9%  NaCl infusion  Intravenous Continuous   temazepam (RESTORIL) cap 15 mg 15 mg Oral Nightly PRN   DOBUTamine HCl (DOBUTREX) 250 mg in sodium chloride 0.9 % 250 mL infusion 2.5-10 mcg/kg/min Int Or      morphINE sulfate (PF) 4 MG/ML injection 4 mg 4 mg Intravenous Q2H PRN   Or      morphINE sulfate (PF) 4 MG/ML injection 8 mg 8 mg Intravenous Q2H PRN   aspirin EC EC tab 325 mg 325 mg Oral Daily   Senna-Docusate Sodium (SENOKOT S) 8.6-50 MG tab 2 +BS  Neuro: Grossly normal, CN intact, sensory intact  Psych: Affect- normal  SKIN: warm, dry  EXT: no edema     Assessment/Plan     Mr. Luciano Martin is a 59 yo male with HTN, insomnia, ART, Paroxysmal atrial flutter/atrial fib and Severe Aortic stenosis.  Pt is

## 2018-05-16 NOTE — PLAN OF CARE
Problem: CARDIOVASCULAR - ADULT  Goal: Absence of cardiac arrhythmias or at baseline  INTERVENTIONS:  - Continuous cardiac monitoring, monitor vital signs, obtain 12 lead EKG if indicated  - Evaluate effectiveness of antiarrhythmic and heart rate control m oxygen saturation or ABGs  - Provide Smoking Cessation handout, if applicable  - Encourage broncho-pulmonary hygiene including cough, deep breathe, Incentive Spirometry  - Assess the need for suctioning and perform as needed  - Assess and instruct to repor

## 2018-05-16 NOTE — PROGRESS NOTES
Pulmonary Progress Note     Assessment / Plan:  1. AS s/p AVR  - extubated per protocol, encourage IS and ambulation  - chest tube in place  - per CTS and cardiology  2. Afib  - per cards  3. ART  - CPAP  4. PPx  - Lovenox  5. FEN  - ADAT  6.  Dispo  - ICU,

## 2018-05-16 NOTE — CARDIAC REHAB
Cardiac Rehab Phase I    Activity:   Chair: Yes   Ambulation: Yes   Assistive Device: Walker   Distance: 150 feet   Assistance needed: Minimal   Patient tolerated activity: Well. Shower Date:  Tolerated Shower Activity .     Education:  Handouts provided

## 2018-05-16 NOTE — PROGRESS NOTES
[facility]    Progress Note    Allie Jenkins Patient Status:  Inpatient    1955 MRN [id number]   Location [facility] 2W/SW Attending Peewee Baez MD   Hosp Day # 2 PCP Charmaine Wheeler MD     Subjective:  Pt.  With negative. Will recheck CBC in AM   Hemodynamically stable; remove cordis, moore and chest tubes today and change status to PCU  Hx: AFib- currently SR (60s). Amio protocol  Expected post op volume overload improving; wgt trending downward.  Rupert Bryson

## 2018-05-16 NOTE — CM/SW NOTE
SW met with the pt to check in post-op. Pt states he is feeling ok, feels on track to return home. APN anticipates dc Friday 5/18. Pt is aware and feels he will do well with the home health services. Residential C/Arielle is aware.      JEANA haider x

## 2018-05-17 ENCOUNTER — APPOINTMENT (OUTPATIENT)
Dept: CT IMAGING | Facility: HOSPITAL | Age: 63
DRG: 219 | End: 2018-05-17
Attending: HOSPITALIST
Payer: COMMERCIAL

## 2018-05-17 PROCEDURE — 70450 CT HEAD/BRAIN W/O DYE: CPT | Performed by: HOSPITALIST

## 2018-05-17 RX ORDER — POTASSIUM CHLORIDE 20 MEQ/1
20 TABLET, EXTENDED RELEASE ORAL ONCE
Status: COMPLETED | OUTPATIENT
Start: 2018-05-17 | End: 2018-05-17

## 2018-05-17 RX ORDER — FUROSEMIDE 10 MG/ML
20 INJECTION INTRAMUSCULAR; INTRAVENOUS ONCE
Status: COMPLETED | OUTPATIENT
Start: 2018-05-17 | End: 2018-05-17

## 2018-05-17 NOTE — CARDIAC REHAB
Cardiac Rehab Phase I    Activity:   Chair: Yes   Ambulation: Yes   Assistive Device: Wqlker   Distance: 400 feet   Assistance needed: Minimal as patient is somewhat \"shaky\". VSS. Patient attributes this to decrease activity; exercised daily pre-op.    Pa

## 2018-05-17 NOTE — PLAN OF CARE
Problem: CARDIOVASCULAR - ADULT  Goal: Maintains optimal cardiac output and hemodynamic stability  INTERVENTIONS:  - Monitor vital signs, rhythm, and trends  - Monitor for bleeding, hypotension and signs of decreased cardiac output  - Evaluate effectivenes interventions, skin care algorithm/standards of care as needed   Outcome: Progressing  Pt able to turn self.    Goal: Incision(s), wounds(s) or drain site(s) healing without S/S of infection  INTERVENTIONS:  - Assess and document risk factors for pressure u effects  - Notify MD/LIP if interventions unsuccessful or patient reports new pain  - Anticipate increased pain with activity and pre-medicate as appropriate   Outcome: Progressing  Verbalizes mod to full relief from 2 tabs of Tylenol with Codeine.

## 2018-05-17 NOTE — OCCUPATIONAL THERAPY NOTE
OCCUPATIONAL THERAPY EVALUATION - INPATIENT      Room Number: 230/230-A  Evaluation Date: 5/17/2018  Type of Evaluation: Initial  Presenting Problem:  (AVR)    Physician Order: IP Consult to Occupational Therapy  Reason for Therapy: ADL/IADL Dysfunction an 2/7/2014   • Obstructive sleep apnea (adult) (pediatric) PSG 11/16/15    AHI 14 REM AHI 13 O2 Rene 84%; CPAP 10 cm   • Ocular migraine 10/8/2014   • Paroxysmal atrial fibrillation ([facility] [address] 3/9/2015   • Sleep apnea     cpap   • Valvular disease     aortic yamilet Putting on and taking off regular lower body clothing?: A Little  -   Bathing (including washing, rinsing, drying)?: A Little  -   Toileting, which includes using toilet, bedpan or urinal? : A Little  -   Putting on and taking off regular upper body cloth

## 2018-05-17 NOTE — PROGRESS NOTES
Assessment and Plan:     1. Bioprosthetic AVR for AS 5/14/18 + GEMMA clip  2. Platelet 40J  - no heparin  - HIPA 5/15 neg  3. Atrial fibrillation  - transient last night  - amio protocol  4.  Paroxysmal atrial flutter  - one episode in 3/15 while on vacatio chronic microvascular ischemic disease. 3. There is large vessel atherosclerosis of the anterior and posterior circulations. 4. Lesser incidental findings as above.      Dictated by (CST): Lyric Caldwell MD on 5/17/2018 at 10:27     Approved by (CST): Alicia Brian

## 2018-05-17 NOTE — PHYSICAL THERAPY NOTE
PHYSICAL THERAPY EVALUATION - INPATIENT     Room Number: 230/230-A  Evaluation Date: 5/17/2018  Type of Evaluation: Initial   Physician Order: PT Eval and Treat    Presenting Problem: s/p AVR w/ GEMMA clip surgery on 5/14  Reason for Therapy: Mobility Dysfu smoker with a PMH of known aortic stenosis, HTN, HLD, and PAF who presents for surgical evaluation of his progressing aortic stenosis.  The patient states that he has been told for years that he has had a murmur and has been following his aortic stenosis an Comment: Procedure: COLONOSCOPY, POSSIBLE BIOPSY,                POSSIBLE POLYPECTOMY 02735;  Surgeon: Alexis Johnson MD;  Location: [address] [place] [address]  05/14/2018: VALVE REPLACEMENT  No date: VASECTOMY    HOME SITUATION  Type of [place] STATUS  Gait Assessment   Gait Assistance:  (SBA)  Distance (ft): 150ft x 1; 75 ft x 2  Assistive Device: None;Rolling walker     Stoop/Curb Assistance:  (CGA)       Bed Mobility: NT    Transfers: SBA    Exercise/Education Provided:  Bed mobility  Body mec

## 2018-05-17 NOTE — PROGRESS NOTES
Misc. Note    Bladimir Rose NP  2018  [facility] HOSP [facility]    Progress Note    Dann Winters Patient Status:  Inpatient    1955 MRN [id number]   Location [facility] 2W/SW Attending Farrukh Fam MD   Hosp Day # 3 PCP [facility] Nitroglycerin in D5W     • norepinephrine Stopped (05/15/18 0130)   • nitroprusside (NIPRIDE) 50 mg in D5W infusion     • amiodarone 0.5 mg/min (05/14/18 2200)       General appearance: alert and cooperative  Neurologic: Alert and oriented X 3, tongue midl TSH 1.47 05/14/2018   PSA 0.54 12/14/2017   MG 2.0 05/17/2018                   Bladimir Rose NP  5/17/2018

## 2018-05-17 NOTE — PROGRESS NOTES
[facility] Hospitalist Team  Progress Note    Kenanjerrodfarrah Mccoy Patient Status:  Inpatient    1955 MRN [id number]   Location [facility] 2W/SW Attending Milton Ivey MD   Hosp Day # 3 PCP Jimenez Clarke MD     CC: Follow Up  PCP: Teresita Ace out. States he has area above left eye with lack of vision, noted this after surgery but got better but came back last night. OBJECTIVE:   Blood pressure (!) 130/97, pulse 60, temperature 97.9 °F (36.6 °C), temperature source Temporal, resp.  rate 17, 10 mg 10 mg Oral Nightly PRN   sodium bicarbonate 150 mEq in dextrose 5 % 1,000 mL infusion 0.5 mL/kg/hr Intravenous Continuous   norepinephrine (LEVOPHED) 4 mg/250 ml premix infusion 0.5-8 mcg/min Intravenous Continuous   sodium chloride 0.9 % irrigation C (VITAMIN C) tab 500 mg 500 mg Oral TID   mupirocin (BACTROBAN) 2% nasal ointment OINT 1 Application 1 Application Nasal BID   Chlorhexidine Gluconate (PERIDEX) 0.12 % solution 15 mL 15 mL Mouth/Throat BID   morphINE sulfate (PF) 2 MG/ML injection 2 mg 2 position. Minimal bibasilar atelectasis. Dictated by (CST): Kylah Vilchis MD on 5/14/2018 at 14:02     Approved by (CST): Kylah Vilchis MD on 5/14/2018 at 14:05              SEE ATTENDING NOTE BELOW:      Patient seen and examined independently.   Disc ordered, holding lovenox, follow  -insulin  per protocol, pre op A1C 5.3  -ASA, lopressor, amiodarone  -prn pain meds  -cardiac rehab- anticipate [facility] with tentative D/C on Friday  -as per [facility]     Post op Vision Changes  -complaints of seeing grayish

## 2018-05-17 NOTE — HOME CARE LIAISON
Received referral from LORAINE Kuhn. Met with patient at the bedside. Patient is agreeable to [facility], pending orders. Brochure and liaison's business card provided with contact information. All questions addressed and answered.

## 2018-05-17 NOTE — PROGRESS NOTES
Pulmonary Progress Note     Assessment / Plan:  1. AS s/p AVR  - extubated per protocol, encourage IS and ambulation  - per CTS and cardiology  2. Afib  - per cards  3. ART  - CPAP  4. TCP - HIPA negative  - Lovenox on hold  5. PPx  - scds  6.  Dispo  - inp

## 2018-05-18 ENCOUNTER — APPOINTMENT (OUTPATIENT)
Dept: MRI IMAGING | Facility: HOSPITAL | Age: 63
DRG: 219 | End: 2018-05-18
Attending: NURSE PRACTITIONER
Payer: COMMERCIAL

## 2018-05-18 ENCOUNTER — APPOINTMENT (OUTPATIENT)
Dept: GENERAL RADIOLOGY | Facility: HOSPITAL | Age: 63
DRG: 219 | End: 2018-05-18
Attending: CLINICAL NURSE SPECIALIST
Payer: COMMERCIAL

## 2018-05-18 PROCEDURE — 71046 X-RAY EXAM CHEST 2 VIEWS: CPT | Performed by: CLINICAL NURSE SPECIALIST

## 2018-05-18 PROCEDURE — 99254 IP/OBS CNSLTJ NEW/EST MOD 60: CPT | Performed by: OTHER

## 2018-05-18 PROCEDURE — 70551 MRI BRAIN STEM W/O DYE: CPT | Performed by: NURSE PRACTITIONER

## 2018-05-18 RX ORDER — DOXEPIN HYDROCHLORIDE 50 MG/1
1 CAPSULE ORAL DAILY
Qty: 30 TABLET | Refills: 0 | Status: SHIPPED | OUTPATIENT
Start: 2018-05-19

## 2018-05-18 RX ORDER — WARFARIN SODIUM 5 MG/1
5 TABLET ORAL ONCE
Qty: 1 TABLET | Refills: 0 | Status: SHIPPED | OUTPATIENT
Start: 2018-05-18 | End: 2018-05-19

## 2018-05-18 RX ORDER — AMIODARONE HYDROCHLORIDE 200 MG/1
200 TABLET ORAL DAILY
Qty: 30 TABLET | Refills: 0 | Status: SHIPPED | OUTPATIENT
Start: 2018-05-18 | End: 2018-05-19

## 2018-05-18 RX ORDER — ASCORBIC ACID 500 MG
500 TABLET ORAL 3 TIMES DAILY
Qty: 90 TABLET | Refills: 0 | Status: SHIPPED | OUTPATIENT
Start: 2018-05-18

## 2018-05-18 RX ORDER — ACETAMINOPHEN AND CODEINE PHOSPHATE 300; 30 MG/1; MG/1
1 TABLET ORAL EVERY 4 HOURS PRN
Qty: 30 TABLET | Refills: 0 | Status: ON HOLD | OUTPATIENT
Start: 2018-05-18 | End: 2018-05-31

## 2018-05-18 RX ORDER — PSEUDOEPHEDRINE HCL 30 MG
100 TABLET ORAL 2 TIMES DAILY
Qty: 30 CAPSULE | Refills: 0 | Status: SHIPPED | OUTPATIENT
Start: 2018-05-18 | End: 2018-06-12

## 2018-05-18 RX ORDER — WARFARIN SODIUM 10 MG/1
10 TABLET ORAL
Status: DISCONTINUED | OUTPATIENT
Start: 2018-05-18 | End: 2018-05-18

## 2018-05-18 RX ORDER — ASPIRIN 81 MG/1
81 TABLET ORAL DAILY
Qty: 90 TABLET | Refills: 0 | Status: SHIPPED | OUTPATIENT
Start: 2018-05-19 | End: 2019-08-02 | Stop reason: ALTCHOICE

## 2018-05-18 RX ORDER — ASPIRIN 81 MG/1
81 TABLET ORAL DAILY
Status: DISCONTINUED | OUTPATIENT
Start: 2018-05-19 | End: 2018-05-19

## 2018-05-18 RX ORDER — WARFARIN SODIUM 5 MG/1
5 TABLET ORAL
Status: COMPLETED | OUTPATIENT
Start: 2018-05-18 | End: 2018-05-18

## 2018-05-18 NOTE — PROGRESS NOTES
[facility] Hospitalist Progress Note     PCP: Jose Angel Tipton MD    CC: Follow up       Assessment/Plan:       57 yo male with HTN, insomnia, ART, Paroxysmal atrial flutter/atrial fib and Severe Aortic stenosis. Pt is S/P Bioprosthetic AVR with LAE Clip.  Po patient and/or family by bedside. Thank Mayi Juarez M.D.  [facility] Hospitalist  Answering Service: [phone]        Subjective     No new complaints, eyes symptoms nearly resolved.   He states if he closes his eyes and really concentrat Vitamin C  500 mg Oral TID   • mupirocin  1 Application Nasal BID   • Chlorhexidine Gluconate  15 mL Mouth/Throat BID   • Senna-Docusate Sodium  2 tablet Oral BID   • Amiodarone HCl  200 mg Oral TID CC   • Albumin Human  250 mL Intravenous Once     • sodiu 05/18/18   0718  05/18/18   1134   PGLU  113*  124*  130*  90  143*       No results for input(s): TROP in the last 168 hours.     Imaging:Xr Chest Pa + Lat Chest (cpt=71046)    Result Date: 5/18/2018  CONCLUSION:   Trace pulmonary vascular redistribution w

## 2018-05-18 NOTE — PROGRESS NOTES
Pulmonary Progress Note      NAME: Rolan Osman - ROOM: 230/230-A - MRN: [id number] - Age: 58year old - : 1955    Assessment/Plan:  1. AS s/p AVR  - extubated per protocol, encourage IS and ambulation. On RA now  - per CTS and cardiology  2.  Ann Marie BS.   Extremity: no edema    Recent Labs   Lab  05/18/18   0404   RBC  3.59*   HGB  11.9*   HCT  34.9*   MCV  97.2   MCH  33.2*   MCHC  34.1   RDW  13.0   WBC  6.0   PLT  68*     Recent Labs   Lab  05/14/18   1200  05/14/18   2057  05/15/18   0519  05/17/1

## 2018-05-18 NOTE — PROGRESS NOTES
Misc. Note    Carmen Birch NP  2018  [facility] HOSP - [facility]    Progress Note    Malika [facility] Patient Status:  Inpatient    1955 MRN [id number]   Location [facility] 2W/SW Attending Booker Auguste MD   Hosp Day # 4 PCP [facility] oriented X 3, tongue midline smile symmetrical denies difficulty chewing or swallowing; normal strength and tone.   Normal coordination and gait  Psychiatric: calm  Sternum Incision dressing C/D/I; chest tube dressing serous drainage was removed  Pulmonary: 05/18/2018   PLT 68 (L) 05/18/2018   CREATSERUM 0.68 05/17/2018   BUN 31 (H) 05/17/2018    05/17/2018   K 4.4 05/17/2018    05/17/2018   CO2 30 05/17/2018    (H) 05/17/2018   CA 8.6 05/17/2018   ALB 4.1 05/07/2018   ALKPHO 91 05/07/2018

## 2018-05-18 NOTE — CARDIAC REHAB
Cardiac Rehab Phase I    Activity:   Chair: yes   Ambulation: yes   Assistive Device: none   Distance: 200 feet   Assistance needed: no   Patient tolerated activity: well. Shower Date: 5/18/18 Tolerated Shower Activity well.  Incision care done with good

## 2018-05-18 NOTE — PROGRESS NOTES
Assessment and Plan:     1. Bioprosthetic AVR for AS 5/14/18 + GEMMA clip  2. Platelet 67D  - no heparin  - HIPA 5/15 neg  3. Atrial fibrillation  - transient -- presently SR  - amio protocol  4.  Paroxysmal atrial flutter  - one episode in 3/15 while on va parenchymal volume loss with sequela of chronic microvascular ischemic disease. 3. There is large vessel atherosclerosis of the anterior and posterior circulations. 4. Lesser incidental findings as above.      Dictated by (CST): Viral Aviles MD on 5/17

## 2018-05-18 NOTE — CONSULTS
Neurology Inpatient Consult Note    Prudence Navarro : 1955   Referring Clinician: Lety Hoffman NP  HPI:     Prudence Navarro is a 58year old male who is being seen in neurologic evaluation.     Yesterday, patient noted \"ghosting\" of his vision, in t Social History:  Social History    Marital status:              Spouse name:                       Years of education:                 Number of children:               Social History Main Topics    Smoking status: Never Smoker ischemic disease. Carotid Dopplers  IMPRESSION:  No evidence of hemodynamically significant stenosis.       LABS: reviewed     ASSESSMENT AND PLAN:   Acute ischemic strokes  Presumably due to two ischemic events, one in the right cerebellar hemisphere

## 2018-05-18 NOTE — PHYSICAL THERAPY NOTE
Spoke with RN who approves participation. Patient then going for MRI and would like wife to be present for stair training if possible. Will check back.

## 2018-05-18 NOTE — DISCHARGE SUMMARY
[facility] Hospitalist Discharge Summary   Patient ID:  Elmer Mccoy  [id number]  18 year old  1/31/1955    Admit date: 5/14/2018  Discharge date: 5/18/2018    Primary Care Physician: Jimenez Clarke MD   Attending Physician: Zuleyka Hoffman MD   Consults AVILA Clip  -5/2018 cardiac cath with nkechi ledezma, severe AS with mean gradient 58 mmHg  -5/14/18 S/P AVR with 25MM OSULLIVAN PERIMOUNT MAGNA EASE VALVE, AVILA clip  -follow for expected acute blood loss anemia, pre op hgb 14.8-->11.8-->12.1-->11.9  -ASA.  However i at 8:54          Ct Brain Or Head (58611)    Result Date: 5/17/2018  CONCLUSION:  1. No acute intracranial process by noncontrast CT technique. 2. Senescent changes of parenchymal volume loss with sequela of chronic microvascular ischemic disease.   3. The Tabs  · ascorbic acid 500 MG Tabs  · metoprolol Tartrate 25 MG Tabs  · multivitamin Tabs       Important follow up:    Dr Tommy Acosta Dr [facility]   Specialty clinic at [place]   Follow up with optho about vision issues     Disposition: home  D

## 2018-05-18 NOTE — OCCUPATIONAL THERAPY NOTE
OCCUPATIONAL THERAPY TREATMENT NOTE - INPATIENT    Room Number: 540/211-X    Presenting Problem:  (AVR)     Problem List  Principal Problem:     Aortic valve stenosis, severe  Active Problems:    Paroxysmal atrial fibrillation (HCC)    Obstructive sleep apn Score:   Score: 21  Approx Degree of Impairment: 32.79%  Standardized Score (AM-PAC Scale): 44.27  CMS Modifier (G-Code): CJ    FUNCTIONAL TRANSFER ASSESSMENT  Supine to Sit : Not tested  Sit to Stand:  (SBA)  Toilet Transfer: indep  Shower Transfer: indep

## 2018-05-19 VITALS
HEART RATE: 69 BPM | OXYGEN SATURATION: 94 % | DIASTOLIC BLOOD PRESSURE: 87 MMHG | RESPIRATION RATE: 22 BRPM | HEIGHT: 70 IN | BODY MASS INDEX: 26.03 KG/M2 | SYSTOLIC BLOOD PRESSURE: 128 MMHG | TEMPERATURE: 98 F | WEIGHT: 181.81 LBS

## 2018-05-19 RX ORDER — AMIODARONE HYDROCHLORIDE 200 MG/1
200 TABLET ORAL DAILY
Qty: 84 TABLET | Refills: 0 | Status: ON HOLD | OUTPATIENT
Start: 2018-05-19 | End: 2018-05-25

## 2018-05-19 RX ORDER — ACETAMINOPHEN 325 MG/1
650 TABLET ORAL EVERY 6 HOURS PRN
Status: DISCONTINUED | OUTPATIENT
Start: 2018-05-19 | End: 2018-05-19

## 2018-05-19 NOTE — PLAN OF CARE
Problem: Patient Centered Care  Goal: Patient preferences are identified and integrated in the patient's plan of care  Interventions:  - What would you like us to know as we care for you?  Patient's son is a physician  - Provide timely, complete, and accura indicated  - Evaluate effectiveness of antiarrhythmic and heart rate control medications as ordered  - Initiate emergency measures for life threatening arrhythmias  - Monitor electrolytes and administer replacement therapy as ordered   Outcome: Adequate fo oxygenation and minimize respiratory effort  - Oxygen supplementation based on oxygen saturation or ABGs  - Provide Smoking Cessation handout, if applicable  - Encourage broncho-pulmonary hygiene including cough, deep breathe, Incentive Spirometry  - Asses

## 2018-05-19 NOTE — PROGRESS NOTES
[facility] - [facility]    Progress Note    Sveta Craft Patient Status:  Inpatient    1955 MRN [id number]   Location [facility] 2W/SW Attending Carol Gilmore MD   Hosp Day # 5 PCP Austin العراقي MD     Subjective:  Pt. Did n needed  Increase activity up and ambulate  SCDs prophylaxis DVT prevention. Plts continue to trend upward. SIDDHARTHA pending. Received dose of Coumadin last night. Hx: AF- with intermittent post op AF.  Planning for Eliquis as discussed with Cardiology, PCP and

## 2018-05-19 NOTE — PHYSICAL THERAPY NOTE
PHYSICAL THERAPY TREATMENT NOTE - INPATIENT     Room Number: 590/968-K       Presenting Problem: s/p AVR w/ GEMMA clip surgery on 5/14    Problem List  Principal Problem:     Aortic valve stenosis, severe  Active Problems:    Paroxysmal atrial fibrillation (H PAIN ASSESSMENT   Ratin  Location: sternal pain/incision  Management Techniques: Activity promotion; Body mechanics; Relaxation;Repositioning    BALANCE motivated    THERAPEUTIC EXERCISES  Lower Extremity Alternating marching  Ankle pumps  Hip AB/AD  Heel raises  LAQ  Toe raises     Position Sitting & Standing       Patient End of Session: Up in chair;Needs met;Call light within reach;RN aware of session/f

## 2018-05-19 NOTE — DISCHARGE SUMMARY
[facility] Hospitalist Discharge Summary   Patient ID:  Jenifer Mejia  [id number]  77 year old  1/31/1955    Admit date: 5/14/2018  Discharge date: 5/19/2018    Primary Care Physician: Soco Carrasco MD   Attending Physician: Lavelle Cee MD   Consults resolved at d/c. Unclear if possible retinal artery thrombosis or other primary ocular etiology.   Pt will see opthalmology as outpt for formal eye exam.  D/W Dr Nyasia Dinero and he recommended eliquis at discharge instead of coumadin.         S/P Bioprosthetic AVR or SOB    EXAM:   GENERAL: no apparent distress  NEURO: A/A Ox3  RESP: non labored, CTA  CARDIO: Regular, no murmur, sternum with dressing D/I  ABD: soft, NT, ND, BS+  EXTREMITIES: no edema, no calf tenderness  No neuro deficits, balance appears intact, vi #3  Take 1 tablet by mouth every 4 (four) hours as needed for Pain. Notes to patient:  DO NOT EXCEED 3 MG ACETAMINOPHEN IN A 24 HOUR PERIOD     amiodarone HCl 200 MG Tabs  Commonly known as:  PACERONE  Take 1 tablet (200 mg total) by mouth daily.  200mg th Tabs  · apixaban 5 MG Tabs  · ascorbic acid 500 MG Tabs  · aspirin 81 MG Tbec  · docusate sodium 100 MG Caps  · metoprolol Tartrate 25 MG Tabs  · multivitamin Tabs       Important follow up:    Dr Chris [facility]   Specialty clinic at [place]

## 2018-05-19 NOTE — CM/SW NOTE
5/19CM- MD orders received in regards to discharge planning [facility]-Case Management previously noted that a referral was sent to [facility].  This Writer informed Speedy Mcintyre ([id number]) [facility] that the orders are in and the Patient is being discharged to

## 2018-05-19 NOTE — PLAN OF CARE
CARDIOVASCULAR - ADULT    • Maintains optimal cardiac output and hemodynamic stability Adequate for Discharge    BP is normotensive, denies dizziness when changing position or ambulating, gait steady.     PAIN - ADULT    • Verbalizes/displays adequate comfo

## 2018-05-19 NOTE — PROGRESS NOTES
Assessment and Plan:     1. Bioprosthetic AVR for AS 5/14/18 + GEMMA clip  2. Platelet 75G >01  - no heparin  - HIPA 5/15 neg  3. Atrial fibrillation  - transient -- presently SR  - amio protocol  4.  Paroxysmal atrial flutter  - one episode in 3/15 while o Labs   Lab  05/14/18   1200  05/19/18   0421   INR  1.5*  1.4*       Xr Chest Pa + Lat Chest (cpt=71046)    Result Date: 5/18/2018  CONCLUSION:   Trace pulmonary vascular redistribution without edema, improved but not resolved.   Otherwise no acute cardiopu

## 2018-05-23 PROBLEM — D69.6 THROMBOCYTOPENIA (HCC): Status: ACTIVE | Noted: 2018-05-23

## 2018-05-23 PROBLEM — D64.9 ANEMIA, UNSPECIFIED TYPE: Status: ACTIVE | Noted: 2018-05-23

## 2018-05-23 PROBLEM — D69.6 THROMBOCYTOPENIA: Status: ACTIVE | Noted: 2018-05-23

## 2018-05-24 ENCOUNTER — APPOINTMENT (OUTPATIENT)
Dept: GENERAL RADIOLOGY | Facility: HOSPITAL | Age: 63
End: 2018-05-24
Attending: EMERGENCY MEDICINE
Payer: COMMERCIAL

## 2018-05-24 ENCOUNTER — HOSPITAL ENCOUNTER (OUTPATIENT)
Facility: HOSPITAL | Age: 63
Setting detail: OBSERVATION
Discharge: HOME | End: 2018-05-25
Attending: EMERGENCY MEDICINE | Admitting: HOSPITALIST
Payer: COMMERCIAL

## 2018-05-24 DIAGNOSIS — R00.2 PALPITATIONS: ICD-10-CM

## 2018-05-24 DIAGNOSIS — R77.8 ELEVATED TROPONIN: ICD-10-CM

## 2018-05-24 DIAGNOSIS — Z95.2 S/P AORTIC VALVE REPLACEMENT: ICD-10-CM

## 2018-05-24 DIAGNOSIS — R06.02 SOB (SHORTNESS OF BREATH): Primary | ICD-10-CM

## 2018-05-24 PROBLEM — R73.9 HYPERGLYCEMIA: Status: ACTIVE | Noted: 2018-05-24

## 2018-05-24 PROBLEM — R79.89 AZOTEMIA: Status: ACTIVE | Noted: 2018-05-24

## 2018-05-24 PROCEDURE — 84484 ASSAY OF TROPONIN QUANT: CPT | Performed by: EMERGENCY MEDICINE

## 2018-05-24 PROCEDURE — 80048 BASIC METABOLIC PNL TOTAL CA: CPT | Performed by: EMERGENCY MEDICINE

## 2018-05-24 PROCEDURE — 93005 ELECTROCARDIOGRAM TRACING: CPT

## 2018-05-24 PROCEDURE — 83880 ASSAY OF NATRIURETIC PEPTIDE: CPT | Performed by: EMERGENCY MEDICINE

## 2018-05-24 PROCEDURE — 85025 COMPLETE CBC W/AUTO DIFF WBC: CPT | Performed by: EMERGENCY MEDICINE

## 2018-05-24 PROCEDURE — 71045 X-RAY EXAM CHEST 1 VIEW: CPT | Performed by: EMERGENCY MEDICINE

## 2018-05-24 PROCEDURE — 99285 EMERGENCY DEPT VISIT HI MDM: CPT

## 2018-05-24 PROCEDURE — 36415 COLL VENOUS BLD VENIPUNCTURE: CPT

## 2018-05-24 PROCEDURE — 93010 ELECTROCARDIOGRAM REPORT: CPT | Performed by: EMERGENCY MEDICINE

## 2018-05-25 ENCOUNTER — TELEPHONE (OUTPATIENT)
Dept: MEDSURG UNIT | Facility: HOSPITAL | Age: 63
End: 2018-05-25

## 2018-05-25 VITALS
SYSTOLIC BLOOD PRESSURE: 114 MMHG | BODY MASS INDEX: 25.2 KG/M2 | OXYGEN SATURATION: 93 % | WEIGHT: 180 LBS | TEMPERATURE: 98 F | HEART RATE: 64 BPM | RESPIRATION RATE: 18 BRPM | DIASTOLIC BLOOD PRESSURE: 75 MMHG | HEIGHT: 70.8 IN

## 2018-05-25 PROBLEM — Z95.2 S/P AORTIC VALVE REPLACEMENT: Status: ACTIVE | Noted: 2018-05-25

## 2018-05-25 PROBLEM — R79.89 ELEVATED TROPONIN: Status: ACTIVE | Noted: 2018-05-25

## 2018-05-25 PROBLEM — R00.2 PALPITATIONS: Status: ACTIVE | Noted: 2018-05-25

## 2018-05-25 PROBLEM — R77.8 ELEVATED TROPONIN: Status: ACTIVE | Noted: 2018-05-25

## 2018-05-25 PROCEDURE — 94660 CPAP INITIATION&MGMT: CPT

## 2018-05-25 PROCEDURE — A4216 STERILE WATER/SALINE, 10 ML: HCPCS | Performed by: HOSPITALIST

## 2018-05-25 RX ORDER — TRAZODONE HYDROCHLORIDE 100 MG/1
200 TABLET ORAL NIGHTLY
Status: DISCONTINUED | OUTPATIENT
Start: 2018-05-25 | End: 2018-05-25

## 2018-05-25 RX ORDER — TEMAZEPAM 15 MG/1
30 CAPSULE ORAL NIGHTLY PRN
Status: DISCONTINUED | OUTPATIENT
Start: 2018-05-25 | End: 2018-05-25

## 2018-05-25 RX ORDER — AMIODARONE HYDROCHLORIDE 200 MG/1
200 TABLET ORAL DAILY
Status: DISCONTINUED | OUTPATIENT
Start: 2018-05-25 | End: 2018-05-25

## 2018-05-25 RX ORDER — AMIODARONE HYDROCHLORIDE 200 MG/1
200 TABLET ORAL DAILY
Qty: 84 TABLET | Refills: 0 | Status: SHIPPED | COMMUNITY
Start: 2018-05-25 | End: 2018-07-03

## 2018-05-25 RX ORDER — ACETAMINOPHEN 325 MG/1
650 TABLET ORAL EVERY 6 HOURS PRN
Status: DISCONTINUED | OUTPATIENT
Start: 2018-05-25 | End: 2018-05-25

## 2018-05-25 RX ORDER — DOCUSATE SODIUM 100 MG/1
100 CAPSULE, LIQUID FILLED ORAL 2 TIMES DAILY
Status: DISCONTINUED | OUTPATIENT
Start: 2018-05-25 | End: 2018-05-25

## 2018-05-25 RX ORDER — ACETAMINOPHEN AND CODEINE PHOSPHATE 300; 30 MG/1; MG/1
1 TABLET ORAL EVERY 4 HOURS PRN
Status: DISCONTINUED | OUTPATIENT
Start: 2018-05-25 | End: 2018-05-25

## 2018-05-25 RX ORDER — ASPIRIN 81 MG/1
81 TABLET ORAL DAILY
Status: DISCONTINUED | OUTPATIENT
Start: 2018-05-25 | End: 2018-05-25

## 2018-05-25 RX ORDER — ONDANSETRON 2 MG/ML
4 INJECTION INTRAMUSCULAR; INTRAVENOUS EVERY 6 HOURS PRN
Status: DISCONTINUED | OUTPATIENT
Start: 2018-05-25 | End: 2018-05-25

## 2018-05-25 RX ORDER — FUROSEMIDE 10 MG/ML
20 INJECTION INTRAMUSCULAR; INTRAVENOUS ONCE
Status: COMPLETED | OUTPATIENT
Start: 2018-05-25 | End: 2018-05-25

## 2018-05-25 RX ORDER — SODIUM CHLORIDE 0.9 % (FLUSH) 0.9 %
3 SYRINGE (ML) INJECTION AS NEEDED
Status: DISCONTINUED | OUTPATIENT
Start: 2018-05-25 | End: 2018-05-25

## 2018-05-25 RX ORDER — AMIODARONE HYDROCHLORIDE 200 MG/1
200 TABLET ORAL 3 TIMES DAILY
Status: DISCONTINUED | OUTPATIENT
Start: 2018-05-25 | End: 2018-05-25

## 2018-05-25 RX ORDER — METOCLOPRAMIDE HYDROCHLORIDE 5 MG/ML
10 INJECTION INTRAMUSCULAR; INTRAVENOUS EVERY 8 HOURS PRN
Status: DISCONTINUED | OUTPATIENT
Start: 2018-05-25 | End: 2018-05-25

## 2018-05-25 NOTE — H&P
[facility] Hospitalist H&P     CC: Patient presents with:  Chest Pain Angina (cardiovascular)       PCP: Bandar Meadows MD      Assessment and Plan     Mr. Domingo Hamman is an 59 yo male with HTN, insomnia, ART, Paroxysmal atrial flutter/atrial fib and Severe Ao Hospitalist    Answering Service number: [phone]    \[facility]\""     Mr. Jermaine Finn is an 57 yo male with HTN, insomnia, ART, Paroxysmal atrial flutter/atrial fib and Severe Aortic stenosis S/P Bioprosthetic AVR with LAE Clip on 5/14/18 whose last admission was c ALL:    Celexa [Citalopram]         Comment:Heartburn  Rozerem [Ramelteon]         Comment:Drowsy     Home Medications:    Outpatient Prescriptions Marked as Taking for the 5/24/18 encounter [facility] Encounter):  temazepam 30 MG Oral Cap Take 1 capsul Objective     Temp: 98 °F (36.7 °C)  Pulse: 64  Resp: 18  BP: 114/75    Exam  Gen: No acute distress, alert and oriented xx3  Neck Supple, no JVD  Midline incision   Pulm: Lungs clear, normal respiratory effort, No wheezing or crackles  CV: irreg effusions, improved but not resolved. There is mild bibasilar atelectasis. No pneumothorax. BONES:    There is degenerative disease of the thoracic spine. Multiple remote left rib fractures.       CONCLUSION:   Trace pulmonary vascular redistribution witho atrophy. CALVARIUM: Occipital pacchionian granulations are demonstrated. There is no apparent depressed fracture, mass, or other significant visible lesion. SINUSES: Limited views demonstrate no significant mucosal thickening or fluid.   ORBITS: Limited v disease. No further focal signal abnormality of the gray or white matter is perceived.   A nonspecific punctate T1 hyperintense focus in the right thalamus is likely an actual. No intraparenchymal hemorrhage, edema, or cortical sulcal effacement is apparent mid chest pain today. Cardiac surgery 10 days ago. TECHNIQUE:   Single view. FINDINGS:  CARDIAC/VASC: Mild cardiomegaly prominent central vasculature. Left Atriclip. MEDIAST/SUNSHINE:   Atherosclerotic aorta with no visible aneurysm. Sternotomy changes.  L (cpt=71045)    Result Date: 5/14/2018  PROCEDURE: XR CHEST AP PORTABLE (CPT=71010) TIME: 12:31. COMPARISON: None. INDICATIONS: Post op open heart surgery today. TECHNIQUE:   Single view.    FINDINGS:  CARDIAC/VASC: Normal heart size and mildly prominent

## 2018-05-25 NOTE — RESPIRATORY THERAPY NOTE
ART ASSESSMENT:    Pt does have a previous diagnosis of ART. Pt does routinely use a CPAP device at home. CPAP INITIATION:    Pt to be placed on CPAP: yes  Pt refused: no  CPAP settings: Auto Pap 5 min cm H2O. 20 max cm H2O.  Mask Option:  Full face  Inte

## 2018-05-25 NOTE — DISCHARGE SUMMARY
[facility] Internal Medicine Discharge Summary   Patient ID:  Freya Nguyen  [id number]  6 year old  1/31/1955    Admit date: 5/24/2018    Discharge date and time: 5/25/2018  2:42 PM     Attending Physician: No att. 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   Patient discharged on been removed. There are median sternotomy wires and postoperative changes of CABG, aortic valve replacement and left atrial appendage clip. There is mild elevation of the left hemidiaphragm.   LUNGS:    There is pulmonary vascular redistribution without carlos hemisphere are demonstrated. No edema, hemorrhage, mass, or acute infarction is seen. There is cerebellar folial expansion consistent with atrophy. BRAINSTEM: Patchy areas of low attenuation likely reflect sequela of chronic small vessel ischemic disease. appropriate for age. No hydrocephalus, subarachnoid hemorrhage, or effacement of the basal cisterns is appreciated. There is no extra-axial fluid collection. A partially empty sella is demonstrated.  CEREBRUM: There are periventricular and subcortical whit at 15:54     Approved by (CST): Franky Chatman MD on 5/18/2018 at 16:00          Xr Chest Ap Portable  (cpt=71045)    Result Date: 5/24/2018  PROCEDURE: XR CHEST AP PORTABLE (CPT=71010) TIME: 2117 hrs. .   COMPARISON: [facility], XR CHEST P CONCLUSION:  1. Postoperative changes as discussed. All tubes in good position. Status post extubation and NG tube removal. No pneumothorax. Mild bibasilar atelectasis.      Dictated by (CST): Kylah Vilchis MD on 5/15/2018 at 6:46     Approved by (CST)

## 2018-05-25 NOTE — DISCHARGE PLANNING
Pt is a/o, denies pain, denies SOB. To be discharged today continued with home health. Went over heart failure discharge instructions. Went over medications and side effects with patient and family. D/C IV, site CDI, D/C tele.  Assured patient had belonging

## 2018-05-25 NOTE — ED NOTES
Patient is safe to transport to floor per MD. Report given [place] [id number]. Transport via cart requested.

## 2018-05-25 NOTE — ED PROVIDER NOTES
Patient Seen in: [facility] AND [facility] Emergency Department    History   Patient presents with:  Chest Pain Angina (cardiovascular)    Stated Complaint: chest pain     HPI    Patient presents emergency department complaining of palpitations and shortness of POSSIBLE BIOPSY,                POSSIBLE POLYPECTOMY 19094;  Surgeon: Olga Lidia Ocasio MD;  Location: [address] [place] [address]  05/14/2018: VALVE REPLACEMENT      Comment: bioprosthetic AVR 5-18  No date: VASECTOMY        Smoking status: Nev BUN/CREA Ratio 22.4 (*)     All other components within normal limits   TROPONIN I - Abnormal; Notable for the following:     Troponin 0.22 (*)     All other components within normal limits   BNP (B TYPE NATRIUERTIC PEPTIDE) - Abnormal; Notable for the fol 5/24/2018  CONCLUSION:  1. Mild cardiomegaly. Pulmonary venous distention 2.  Bibasilar parenchymal atelectasis and/or consolidation with bilateral effusions have progressed     Dictated by (CST): Anita Lyles MD on 5/24/2018 at 22:13     Approved by

## 2018-05-25 NOTE — ED NOTES
The patient who is s/p cardiac valve replacement from May 14th reports that at approximately 1900 this evening he experienced palpitations and increased shortness of breath like he has felt in the past with uncontrolled atrial fibrillation.  The patient den

## 2018-05-25 NOTE — CONSULTS
[facility]    PATIENT'S NAME: Vinita MAZARIEGOS   ATTENDING PHYSICIAN: Fabricio Rossi MD   CONSULTING PHYSICIAN: Paty Barrios MD   PATIENT ACCOUNT#:   [id number]    LOCATION:  [address] #:   [id number]       DATE OF DASHAWN There is no abdominal pain, melena, hematuria. There is no history of stroke. There are no new allergies. No new rashes. No limiting arthritis. PHYSICAL EXAMINATION:    GENERAL:  He is a male, currently appears comfortable.   VITAL SIGNS:  Tempera

## 2018-05-26 ENCOUNTER — HOSPITAL ENCOUNTER (EMERGENCY)
Facility: HOSPITAL | Age: 63
Discharge: HOME | End: 2018-05-26
Attending: EMERGENCY MEDICINE
Payer: COMMERCIAL

## 2018-05-26 VITALS
HEIGHT: 70 IN | SYSTOLIC BLOOD PRESSURE: 111 MMHG | DIASTOLIC BLOOD PRESSURE: 75 MMHG | HEART RATE: 67 BPM | RESPIRATION RATE: 18 BRPM | TEMPERATURE: 98 F | BODY MASS INDEX: 25.77 KG/M2 | OXYGEN SATURATION: 95 % | WEIGHT: 180 LBS

## 2018-05-26 DIAGNOSIS — I48.91 ATRIAL FIBRILLATION WITH RAPID VENTRICULAR RESPONSE (HCC): Primary | ICD-10-CM

## 2018-05-26 PROCEDURE — 80048 BASIC METABOLIC PNL TOTAL CA: CPT

## 2018-05-26 PROCEDURE — 99284 EMERGENCY DEPT VISIT MOD MDM: CPT

## 2018-05-26 PROCEDURE — 85025 COMPLETE CBC W/AUTO DIFF WBC: CPT | Performed by: EMERGENCY MEDICINE

## 2018-05-26 PROCEDURE — 84484 ASSAY OF TROPONIN QUANT: CPT

## 2018-05-26 PROCEDURE — 96374 THER/PROPH/DIAG INJ IV PUSH: CPT

## 2018-05-26 PROCEDURE — 93010 ELECTROCARDIOGRAM REPORT: CPT | Performed by: EMERGENCY MEDICINE

## 2018-05-26 PROCEDURE — 80061 LIPID PANEL: CPT | Performed by: EMERGENCY MEDICINE

## 2018-05-26 PROCEDURE — 80048 BASIC METABOLIC PNL TOTAL CA: CPT | Performed by: EMERGENCY MEDICINE

## 2018-05-26 PROCEDURE — 85025 COMPLETE CBC W/AUTO DIFF WBC: CPT

## 2018-05-26 PROCEDURE — 93005 ELECTROCARDIOGRAM TRACING: CPT

## 2018-05-26 RX ORDER — METOPROLOL TARTRATE 5 MG/5ML
INJECTION INTRAVENOUS
Status: COMPLETED
Start: 2018-05-26 | End: 2018-05-26

## 2018-05-26 RX ORDER — METOPROLOL TARTRATE 5 MG/5ML
5 INJECTION INTRAVENOUS ONCE
Status: COMPLETED | OUTPATIENT
Start: 2018-05-26 | End: 2018-05-26

## 2018-05-26 NOTE — ED NOTES
Seen here for same last night and was admitted. Patient d/c yesterday and told he would go in and out of afib but was not told how long he should be in it before he comes to the ER. Denies CP or SOB. CABG 2 weeks ago with valve replacement.

## 2018-05-26 NOTE — ED INITIAL ASSESSMENT (HPI)
PT reports palpitations X 3 hours, pt reports symptoms feel similar to when he is in episodes of afib. Pt reports he has been unable to sleep with symptoms. Denies any pain.

## 2018-05-26 NOTE — ED PROVIDER NOTES
Patient Seen in: [facility] AND [facility] Emergency Department    History   Patient presents with:  Arrythmia/Palpitations (cardiovascular)    Stated Complaint: \"Been in AFib for 3 hours. \" Had heart surgery x2 weeks ago.     HPI    57 yo male with h/o paroxysm noted in HPI. Constitutional and vital signs reviewed. All other systems reviewed and negative except as noted above.     Physical Exam   ED Triage Vitals [05/26/18 0408]  BP: 107/81  Pulse: 103  Resp: 20  Temp: 98.1 °F (36.7 °C)  Temp src: Temporal 34.8 (*)     MCH 32.7 (*)     Neutrophil Absolute 7.8 (*)     Lymphocyte Absolute 0.7 (*)     All other components within normal limits   CBC WITH DIFFERENTIAL WITH PLATELET    Narrative:      The following orders were created for panel order CBC WITH DIFFE

## 2018-05-28 ENCOUNTER — APPOINTMENT (OUTPATIENT)
Dept: CT IMAGING | Facility: HOSPITAL | Age: 63
DRG: 186 | End: 2018-05-28
Attending: EMERGENCY MEDICINE
Payer: COMMERCIAL

## 2018-05-28 ENCOUNTER — HOSPITAL ENCOUNTER (INPATIENT)
Facility: HOSPITAL | Age: 63
LOS: 3 days | Discharge: HOME HEALTH | DRG: 186 | End: 2018-05-31
Attending: EMERGENCY MEDICINE | Admitting: HOSPITALIST
Payer: COMMERCIAL

## 2018-05-28 DIAGNOSIS — J94.2 HEMOTHORAX ON RIGHT: Primary | ICD-10-CM

## 2018-05-28 PROCEDURE — 96374 THER/PROPH/DIAG INJ IV PUSH: CPT

## 2018-05-28 PROCEDURE — 86850 RBC ANTIBODY SCREEN: CPT | Performed by: EMERGENCY MEDICINE

## 2018-05-28 PROCEDURE — 80048 BASIC METABOLIC PNL TOTAL CA: CPT | Performed by: EMERGENCY MEDICINE

## 2018-05-28 PROCEDURE — 85025 COMPLETE CBC W/AUTO DIFF WBC: CPT | Performed by: EMERGENCY MEDICINE

## 2018-05-28 PROCEDURE — 71260 CT THORAX DX C+: CPT | Performed by: EMERGENCY MEDICINE

## 2018-05-28 PROCEDURE — A4216 STERILE WATER/SALINE, 10 ML: HCPCS | Performed by: HOSPITALIST

## 2018-05-28 PROCEDURE — 86901 BLOOD TYPING SEROLOGIC RH(D): CPT | Performed by: EMERGENCY MEDICINE

## 2018-05-28 PROCEDURE — 81003 URINALYSIS AUTO W/O SCOPE: CPT | Performed by: EMERGENCY MEDICINE

## 2018-05-28 PROCEDURE — 84484 ASSAY OF TROPONIN QUANT: CPT | Performed by: EMERGENCY MEDICINE

## 2018-05-28 PROCEDURE — 85730 THROMBOPLASTIN TIME PARTIAL: CPT | Performed by: EMERGENCY MEDICINE

## 2018-05-28 PROCEDURE — 85610 PROTHROMBIN TIME: CPT | Performed by: EMERGENCY MEDICINE

## 2018-05-28 PROCEDURE — 93010 ELECTROCARDIOGRAM REPORT: CPT | Performed by: EMERGENCY MEDICINE

## 2018-05-28 PROCEDURE — 99285 EMERGENCY DEPT VISIT HI MDM: CPT

## 2018-05-28 PROCEDURE — 93005 ELECTROCARDIOGRAM TRACING: CPT

## 2018-05-28 PROCEDURE — 83880 ASSAY OF NATRIURETIC PEPTIDE: CPT | Performed by: EMERGENCY MEDICINE

## 2018-05-28 PROCEDURE — 86900 BLOOD TYPING SEROLOGIC ABO: CPT | Performed by: EMERGENCY MEDICINE

## 2018-05-28 PROCEDURE — 87641 MR-STAPH DNA AMP PROBE: CPT | Performed by: HOSPITALIST

## 2018-05-28 RX ORDER — SODIUM CHLORIDE 0.9 % (FLUSH) 0.9 %
3 SYRINGE (ML) INJECTION AS NEEDED
Status: DISCONTINUED | OUTPATIENT
Start: 2018-05-28 | End: 2018-05-31

## 2018-05-28 RX ORDER — TRAZODONE HYDROCHLORIDE 100 MG/1
200 TABLET ORAL NIGHTLY PRN
Status: DISCONTINUED | OUTPATIENT
Start: 2018-05-28 | End: 2018-05-31

## 2018-05-28 RX ORDER — FUROSEMIDE 10 MG/ML
20 INJECTION INTRAMUSCULAR; INTRAVENOUS
Status: DISCONTINUED | OUTPATIENT
Start: 2018-05-28 | End: 2018-05-31

## 2018-05-28 RX ORDER — DILTIAZEM HYDROCHLORIDE 120 MG/1
120 CAPSULE, COATED, EXTENDED RELEASE ORAL DAILY
Status: DISCONTINUED | OUTPATIENT
Start: 2018-05-29 | End: 2018-05-31

## 2018-05-28 RX ORDER — TEMAZEPAM 15 MG/1
30 CAPSULE ORAL NIGHTLY PRN
Status: DISCONTINUED | OUTPATIENT
Start: 2018-05-28 | End: 2018-05-31

## 2018-05-28 RX ORDER — DOCUSATE SODIUM 100 MG/1
100 CAPSULE, LIQUID FILLED ORAL 2 TIMES DAILY
Status: DISCONTINUED | OUTPATIENT
Start: 2018-05-28 | End: 2018-05-31

## 2018-05-28 RX ORDER — AMIODARONE HYDROCHLORIDE 200 MG/1
200 TABLET ORAL 2 TIMES DAILY WITH MEALS
Status: DISCONTINUED | OUTPATIENT
Start: 2018-05-28 | End: 2018-05-31

## 2018-05-28 RX ORDER — ONDANSETRON 2 MG/ML
4 INJECTION INTRAMUSCULAR; INTRAVENOUS EVERY 6 HOURS PRN
Status: DISCONTINUED | OUTPATIENT
Start: 2018-05-28 | End: 2018-05-31

## 2018-05-28 RX ORDER — FUROSEMIDE 10 MG/ML
40 INJECTION INTRAMUSCULAR; INTRAVENOUS ONCE
Status: COMPLETED | OUTPATIENT
Start: 2018-05-28 | End: 2018-05-28

## 2018-05-28 RX ORDER — ACETAMINOPHEN 325 MG/1
650 TABLET ORAL EVERY 6 HOURS PRN
Status: DISCONTINUED | OUTPATIENT
Start: 2018-05-28 | End: 2018-05-31

## 2018-05-28 NOTE — H&P
[facility] Hospitalist H&P       CC: Patient presents with:  Dyspnea FREIDA SOB (respiratory)       PCP: Neel Murphy MD    History of Present Illness: Mr. Kalina Morales is an 57 yo male with HTN, insomnia, ART, Paroxysmal atrial flutter/atrial fib and Severe Aor Procedure: COLONOSCOPY, POSSIBLE BIOPSY,                POSSIBLE POLYPECTOMY 80192;  Surgeon: Tasha Munson MD;  Location: [address] [place] [address]  05/14/2018: VALVE REPLACEMENT      Comment: bioprosthetic AVR 5-18  No date: VASECTOMY Extremities: Extremities trace edema . Skin: Skin color, texture, turgor normal. No rashes or lesions.     Neurologic: Normal strength, no focal deficit appreciated     Diagnostic Data:    CBC/Chem  Recent Labs   Lab  05/24/18   1253  05/24/18   2126  0 consulted, discussed with Dr. Roxy Ventura  - Cardiology consulted  - admit to PCU  - continuous pulse ox, tele  - CXR in am  - may need thora vs chest tube, defer to CT surg/Pulm  - ECHO ordered    Elevated trop  - trop 0.13  - last trop 0.18 2 days ago, charles

## 2018-05-28 NOTE — ED INITIAL ASSESSMENT (HPI)
Pt had aortic valve replacement on 05/14/2018 and now has dyspnea on exertion. Appears pale and is taking elequis now.

## 2018-05-28 NOTE — ED PROVIDER NOTES
Patient Seen in: [facility] AND [facility] Emergency Department     History   Patient presents with:  Dyspnea FREIDA SOB (respiratory)    Stated Complaint: shortness of breath     HPI    58year old male with a history of paroxysmal atrial flutter/atrial fib and Toya COLONOSCOPY,BIOPSY N/A      Comment: Procedure: COLONOSCOPY, POSSIBLE BIOPSY,                POSSIBLE POLYPECTOMY 93321;  Surgeon: Jaylon Marsh MD;  Location: [address] [place] [address]  05/14/2018: VALVE REPLACEMENT      Comment: bioprost shortness of breath   Other systems are as noted in HPI. Constitutional and vital signs reviewed. All other systems reviewed and negative except as noted above. PSFH elements reviewed from today and agreed except as otherwise stated in HPI.     Phy note and vitals reviewed.               ED Course   Nursing notes and Triage vitals reviewed  Labs Reviewed   BASIC METABOLIC PANEL (8) - Abnormal; Notable for the following:        Result Value    Glucose 128 (*)     Sodium 134 (*)     All other components [address] (BLOOD MAEX)[[id number]]                               Final result               ANTIBODY WCKISA[[id number]]                                  Final result                 Please view results for these tests on the individual orders.    [address] (BLOOD T images of the upper abdomen are unremarkable. BONES: Evidence of prior sternotomy. Moderate osteoarthritic changes are     seen in the spine. OTHER: Negative.           =====    CONCLUSION:     1.  No evidence of pulmonary emboli or aortic dissectio troponin; and Hemothorax on right on his problem list. These problems contribute to the complexity of this ED evaluation.     Medical Record Review:   I personally reviewed available prior medical records for any recent pertinent discharge summaries, testin required. I personally discussed the results of the above ED workup and a number of associated acute management issues with the patient, and I explained the need for further follow-up evaluation and treatment.     Risk:  Patient is at High risk of significa

## 2018-05-29 ENCOUNTER — APPOINTMENT (OUTPATIENT)
Dept: GENERAL RADIOLOGY | Facility: HOSPITAL | Age: 63
DRG: 186 | End: 2018-05-29
Attending: HOSPITALIST
Payer: COMMERCIAL

## 2018-05-29 ENCOUNTER — APPOINTMENT (OUTPATIENT)
Dept: CV DIAGNOSTICS | Facility: HOSPITAL | Age: 63
DRG: 186 | End: 2018-05-29
Attending: HOSPITALIST
Payer: COMMERCIAL

## 2018-05-29 PROCEDURE — A4216 STERILE WATER/SALINE, 10 ML: HCPCS | Performed by: HOSPITALIST

## 2018-05-29 PROCEDURE — 85027 COMPLETE CBC AUTOMATED: CPT | Performed by: HOSPITALIST

## 2018-05-29 PROCEDURE — 80053 COMPREHEN METABOLIC PANEL: CPT | Performed by: HOSPITALIST

## 2018-05-29 PROCEDURE — 84484 ASSAY OF TROPONIN QUANT: CPT | Performed by: HOSPITALIST

## 2018-05-29 PROCEDURE — 85018 HEMOGLOBIN: CPT | Performed by: HOSPITALIST

## 2018-05-29 PROCEDURE — 93306 TTE W/DOPPLER COMPLETE: CPT | Performed by: HOSPITALIST

## 2018-05-29 PROCEDURE — 85025 COMPLETE CBC W/AUTO DIFF WBC: CPT | Performed by: HOSPITALIST

## 2018-05-29 PROCEDURE — 94660 CPAP INITIATION&MGMT: CPT

## 2018-05-29 PROCEDURE — 85730 THROMBOPLASTIN TIME PARTIAL: CPT | Performed by: HOSPITALIST

## 2018-05-29 PROCEDURE — 83735 ASSAY OF MAGNESIUM: CPT | Performed by: HOSPITALIST

## 2018-05-29 PROCEDURE — 71046 X-RAY EXAM CHEST 2 VIEWS: CPT | Performed by: HOSPITALIST

## 2018-05-29 PROCEDURE — 5A09357 ASSISTANCE WITH RESPIRATORY VENTILATION, LESS THAN 24 CONSECUTIVE HOURS, CONTINUOUS POSITIVE AIRWAY PRESSURE: ICD-10-PCS | Performed by: HOSPITALIST

## 2018-05-29 PROCEDURE — 85014 HEMATOCRIT: CPT | Performed by: HOSPITALIST

## 2018-05-29 RX ORDER — POLYETHYLENE GLYCOL 3350 17 G/17G
17 POWDER, FOR SOLUTION ORAL DAILY PRN
Status: DISCONTINUED | OUTPATIENT
Start: 2018-05-29 | End: 2018-05-31

## 2018-05-29 RX ORDER — HEPARIN SODIUM 1000 [USP'U]/ML
60 INJECTION, SOLUTION INTRAVENOUS; SUBCUTANEOUS ONCE
Status: DISCONTINUED | OUTPATIENT
Start: 2018-05-29 | End: 2018-05-29

## 2018-05-29 RX ORDER — HEPARIN SODIUM AND DEXTROSE 10000; 5 [USP'U]/100ML; G/100ML
INJECTION INTRAVENOUS CONTINUOUS
Status: DISCONTINUED | OUTPATIENT
Start: 2018-05-29 | End: 2018-05-30

## 2018-05-29 RX ORDER — HEPARIN SODIUM AND DEXTROSE 10000; 5 [USP'U]/100ML; G/100ML
12 INJECTION INTRAVENOUS ONCE
Status: COMPLETED | OUTPATIENT
Start: 2018-05-29 | End: 2018-05-29

## 2018-05-29 NOTE — PROGRESS NOTES
Pulmonary H&P/Consult     NAME: Arden Sandoval - ROOM: [address] [place] [address] - MRN: [id number] - Age: 58year old - :  1955    Date of Admission: 2018  5:11 PM  Admission Diagnosis: Hemothorax on right [J94.2]    Assessment/Plan:  1.  Dyspnea/hypoxia - fro Past Surgical History:  2004: COLONOSCOPY      Comment: diverticulosis  No date: COLONOSCOPY      Comment: 2014  9/2/15: COLONOSCOPY & POLYPECTOMY      Comment: diverticulosis and a small polyp was removed;                ASC  9/2/2015: Jaelyn Elliott changes   Heart: Irregular rate and rhythm, normal S1S2, no murmur. Abdomen: soft, non-tender, non-distended, positive BS.    Extremity: no edema    LABS/STUDIES: Reviewed in [facility]  Recent Labs   Lab  05/29/18   0443   RBC  3.38*   HGB  11.0*   HCT  31.9*

## 2018-05-29 NOTE — PLAN OF CARE
Transfer from ICU. Heparin drip started. Next PTT at 2330. Thoracentesis in am. Stephanie Smith will let up know when to stop heparin drip.

## 2018-05-29 NOTE — PROGRESS NOTES
Pt with new large right pleural effusion. Looks uncomplicated on CT. No significant drop in Hg. Recommend holding Eliquis 24 hours then thoracentesis by pulmonary service.

## 2018-05-29 NOTE — PROGRESS NOTES
[facility]  Progress Note    Malcahi Cyr Patient Status:  Inpatient    1955 MRN [id number]   Location [facility] 2W/SW Attending Arik Bowie MD   Hosp Day # 1 PCP Tawanda Chatterjee MD     IMPRESSION:    1.        Pa oriented in no apparent distress. HEENT: No focal deficits. Neck: No JVD, carotids no bruits. Cardiac: Regular rate and rhythm, S1, S2 normal, no murmur, rub or gallop. Lungs: decreased BS bases   Abdomen: Soft, non-tender.    Extremities: Without clubb

## 2018-05-29 NOTE — PROGRESS NOTES
[facility] Hospitalist Progress Note     CC: Hospital Follow up    PCP: Esha Peralta MD       Assessment/Plan:   Mr. Elton Nassar is an 59 yo male with HTN, insomnia, ART, Paroxysmal atrial flutter/atrial fib and Severe Aortic stenosis S/P Bioprosthetic AVR wit carotids-no evidence of stenosis  -CT head-no acute events  -MRI showed acute/subacute infarct of right cerebellar hemisphere.  Also with BRAO branch retinal artery occlusion  -has been on eliquis holding tonight given poss hemothorax, as above, may need he kg)      Exam:   GEN: NAD  HEENT: EOMI  Neck: no JVD  Pulm: decreased breath sounds bibasilar R>L, normal respiratory effort  CV: RRR, no murmurs, peripheral perfusion intact  ABD: Soft, non-tender, non-distended, no HSM  MSK: strength grossly intact in al Postop changes. 6. Atherosclerosis. 7. Osteoarthritis. Dictated by (CST): Steve Dunn MD on 5/28/2018 at 18:54     Approved by (CST):  Steve Dunn MD on 5/28/2018 at 18:59              Meds:     • metoprolol Tartrate  25 mg Oral 2x Daily(Bet

## 2018-05-29 NOTE — CM/SW NOTE
CARE MANAGEMENT  Discharge Planning Evaluation:  Pt from home with wife, saw him at bedside, A/O X3, resting in bed, on 2L NC. States he was doing well after his recent AVR here, then developed more sob several days prior to admission.   He was able t Patient's Mental Status Alert;Oriented   Patient's [address]   Patient lives with Spouse   Patient Status Prior to Admission   Independent with ADLs and Mobility Yes  (yes)   Services in place prior to admission Home Health Care;DME/Supplies

## 2018-05-29 NOTE — RESPIRATORY THERAPY NOTE
ART ASSESSMENT:    Pt does have a previous diagnosis of ART. Pt does routinely use a CPAP device at home. Pt refused to use CPAP for tonight.  Rn aware

## 2018-05-30 ENCOUNTER — APPOINTMENT (OUTPATIENT)
Dept: ULTRASOUND IMAGING | Facility: HOSPITAL | Age: 63
DRG: 186 | End: 2018-05-30
Attending: INTERNAL MEDICINE
Payer: COMMERCIAL

## 2018-05-30 ENCOUNTER — APPOINTMENT (OUTPATIENT)
Dept: GENERAL RADIOLOGY | Facility: HOSPITAL | Age: 63
DRG: 186 | End: 2018-05-30
Attending: INTERNAL MEDICINE
Payer: COMMERCIAL

## 2018-05-30 PROCEDURE — 85025 COMPLETE CBC W/AUTO DIFF WBC: CPT | Performed by: HOSPITALIST

## 2018-05-30 PROCEDURE — 85049 AUTOMATED PLATELET COUNT: CPT | Performed by: HOSPITALIST

## 2018-05-30 PROCEDURE — A4216 STERILE WATER/SALINE, 10 ML: HCPCS | Performed by: HOSPITALIST

## 2018-05-30 PROCEDURE — 87205 SMEAR GRAM STAIN: CPT | Performed by: INTERNAL MEDICINE

## 2018-05-30 PROCEDURE — 89050 BODY FLUID CELL COUNT: CPT | Performed by: INTERNAL MEDICINE

## 2018-05-30 PROCEDURE — 88112 CYTOPATH CELL ENHANCE TECH: CPT | Performed by: INTERNAL MEDICINE

## 2018-05-30 PROCEDURE — 89051 BODY FLUID CELL COUNT: CPT | Performed by: INTERNAL MEDICINE

## 2018-05-30 PROCEDURE — 32555 ASPIRATE PLEURA W/ IMAGING: CPT | Performed by: INTERNAL MEDICINE

## 2018-05-30 PROCEDURE — 83735 ASSAY OF MAGNESIUM: CPT | Performed by: HOSPITALIST

## 2018-05-30 PROCEDURE — 0W993ZZ DRAINAGE OF RIGHT PLEURAL CAVITY, PERCUTANEOUS APPROACH: ICD-10-PCS | Performed by: INTERNAL MEDICINE

## 2018-05-30 PROCEDURE — 85014 HEMATOCRIT: CPT | Performed by: HOSPITALIST

## 2018-05-30 PROCEDURE — 82945 GLUCOSE OTHER FLUID: CPT | Performed by: INTERNAL MEDICINE

## 2018-05-30 PROCEDURE — 83615 LACTATE (LD) (LDH) ENZYME: CPT | Performed by: INTERNAL MEDICINE

## 2018-05-30 PROCEDURE — 71045 X-RAY EXAM CHEST 1 VIEW: CPT | Performed by: INTERNAL MEDICINE

## 2018-05-30 PROCEDURE — 87070 CULTURE OTHR SPECIMN AEROBIC: CPT | Performed by: INTERNAL MEDICINE

## 2018-05-30 PROCEDURE — 84157 ASSAY OF PROTEIN OTHER: CPT | Performed by: INTERNAL MEDICINE

## 2018-05-30 PROCEDURE — 80048 BASIC METABOLIC PNL TOTAL CA: CPT | Performed by: HOSPITALIST

## 2018-05-30 PROCEDURE — 85730 THROMBOPLASTIN TIME PARTIAL: CPT | Performed by: HOSPITALIST

## 2018-05-30 NOTE — PROGRESS NOTES
[facility] Hospitalist Progress Note     CC: Hospital Follow up    PCP: Theron Begum MD    Addendum: d/w pulmonary, fluid bloody in appearance though hematocrit not markedly elevated, will resume eliquis tonight and check AM CBC     Assessment/Plan:    PERIMOUNT MAGNA EASE VALVE, LAE clip  -see cardiac meds below, eliquis due to afib/cva hx - held due to above, was bridged with heparin gtt pre-procedure, fluid bloody on thoracentesis continue to hold anticoag for now     Right cerebellar stroke / retinal 538 ml   Output             1970 ml   Net            -1432 ml       Last 3 Weights  05/30/18 0700 : 170 lb 1.6 oz (77.2 kg)  05/28/18 1712 : 177 lb (80.3 kg)  05/26/18 0408 : 180 lb (81.6 kg)  05/25/18 0104 : 180 lb (81.6 kg)      Exam:   GEN: (cpt=71045)    Result Date: 5/30/2018  CONCLUSION:   * No evidence of pneumothorax post right thoracentesis. * Considerable improvement in aeration of the right chest. * Heart size and left lung unchanged. Dictated by (CST):  Candie Cavazos MD on

## 2018-05-30 NOTE — PROGRESS NOTES
Pulmonary Progress Note     Assessment / Plan:  1. Dyspnea/hypoxia - from large right sided pleural effusion  - wean O2 as able  - IS and OOB as able  2.  Right pleural effusion - appears free flowing and Hgb stable favor volume overload, hemothorax conside

## 2018-05-30 NOTE — PROGRESS NOTES
[facility] Cardiology/[facility]  Progress Note    Sveta Craft Patient Status:  Inpatient    1955 MRN [id number]   Location [facility] 2W/SW Attending Eliza Craig MD   Hosp Day # 2 PCP Austin العراقي MD     IMPRESSION:    1.        Pa height 177.8 cm (5' 10\"), weight 170 lb 1.6 oz (77.2 kg), SpO2 93 %. General: Alert and oriented in no apparent distress. HEENT: No focal deficits. Neck: No JVD, carotids no bruits.   Cardiac: Regular rate and rhythm, S1, S2 normal, no murmur, rub or ga PRN   ondansetron HCl (ZOFRAN) injection 4 mg 4 mg Intravenous Q6H PRN   amiodarone HCl (PACERONE) tab 200 mg 200 mg Oral BID with meals   furosemide (LASIX) injection 20 mg 20 mg Intravenous BID (Diuretic)   docusate sodium (COLACE) cap 100 mg 100 mg Oral

## 2018-05-31 ENCOUNTER — APPOINTMENT (OUTPATIENT)
Dept: GENERAL RADIOLOGY | Facility: HOSPITAL | Age: 63
DRG: 186 | End: 2018-05-31
Attending: INTERNAL MEDICINE
Payer: COMMERCIAL

## 2018-05-31 VITALS
TEMPERATURE: 98 F | HEIGHT: 70 IN | WEIGHT: 166.81 LBS | RESPIRATION RATE: 20 BRPM | OXYGEN SATURATION: 93 % | BODY MASS INDEX: 23.88 KG/M2 | HEART RATE: 77 BPM | SYSTOLIC BLOOD PRESSURE: 97 MMHG | DIASTOLIC BLOOD PRESSURE: 61 MMHG

## 2018-05-31 PROCEDURE — 80048 BASIC METABOLIC PNL TOTAL CA: CPT | Performed by: HOSPITALIST

## 2018-05-31 PROCEDURE — 85049 AUTOMATED PLATELET COUNT: CPT | Performed by: HOSPITALIST

## 2018-05-31 PROCEDURE — A4216 STERILE WATER/SALINE, 10 ML: HCPCS | Performed by: HOSPITALIST

## 2018-05-31 PROCEDURE — 85025 COMPLETE CBC W/AUTO DIFF WBC: CPT | Performed by: HOSPITALIST

## 2018-05-31 PROCEDURE — 71045 X-RAY EXAM CHEST 1 VIEW: CPT | Performed by: INTERNAL MEDICINE

## 2018-05-31 RX ORDER — FUROSEMIDE 20 MG/1
20 TABLET ORAL DAILY
Qty: 30 TABLET | Refills: 0 | Status: SHIPPED | OUTPATIENT
Start: 2018-05-31 | End: 2018-06-12

## 2018-05-31 NOTE — PROGRESS NOTES
Assessment and Plan:     1. Aortic stenosis, bioprosthetic 5/14/18  2. Atrial fibrillation  - paroxysmal  3. Large right effusion  - thoracentesis 5/30/18  4. HTN  5.  Cerebellar CVA 5/18/18      PLAN:  - amiodarone  - Eliquis  - tentatively home today  - lower lobe atelectasis or less likely pneumonia. 3. Aortic valve prosthesis. 4. Left atrial appendage ligation clip. 5. Stable enlargement of cardiac silhouette.      Dictated by (CST): Elouise Pallas, MD on 5/31/2018 at 7:56     Approved by (CST): Allyson Duran

## 2018-05-31 NOTE — CM/SW NOTE
RN informed LORAINE that pt is cleared to discharge today. SW informed La Nena Clements from [facility] of pt's discharge; tatiana Lauren 78 orders have been entered.      Alison Braden, [address]

## 2018-05-31 NOTE — PROGRESS NOTES
Pulmonary Progress Note     Assessment / Plan:  1. Dyspnea/hypoxia - from large right sided pleural effusion  - wean O2 as able  - IS and OOB as able  2. Right pleural effusion - s/p thora on 5/30 with 2L of bloody exudative fluid removed.  Likely related t

## 2018-05-31 NOTE — HOME CARE LIAISON
Patient is current with [address] [place] [address]  Met with patient at the bedside. Patient agreeable to resume home health services.

## 2018-05-31 NOTE — DISCHARGE SUMMARY
General Medicine Discharge Summary     Patient ID:  Nery Hdez  58year old  1/31/1955    Admit date: 5/28/2018    Discharge date and time: 5/31/18    Attending Physician: Jacob Fang MD     Consults: IP CONSULT TO CARDIOLOGY  IP CONSULT TO PULMONOLOGY palpitations, no fevers or chills, no cough or congestion, no headaches or vision changes, no other complaints.          Hospital Course: Mr. Eloina Hui is an 57 yo male with HTN, insomnia, ART, Paroxysmal atrial flutter/atrial fib and Severe Aortic stenosis     Elevated trop  - trop 0.13->0.11, stable  - last trop 0.18, patient denied chest pain, no sign of ACS  - cards consulted  - improved from prior admission  - echo without WMA      S/P Bioprosthetic AVR with LAE Clip  -5/2018 cardiac cath with nkechi ledezma silhouette.      Dictated by (CST): Zaid Blair MD on 5/31/2018 at 7:56     Approved by (CST): Zaid Blair MD on 5/31/2018 at 8:00          Xr Chest Ap Portable  (cpt=71045)    Result Date: 5/30/2018  CONCLUSION:   * No evidence of pneumothorax post r total) by mouth 3 (three) times daily. aspirin 81 MG Tbec  Take 1 tablet (81 mg total) by mouth daily.      DilTIAZem HCl ER Coated Beads 120 MG Tb24  Commonly known as:  CARDIZEM LA     docusate sodium 100 MG Caps  Commonly known as:  COLACE  Take 100 effusion has resolved. Please follow up with  Cardiology in one week. Please follow up with Primary care physician in 1 week.         Medication List      START taking these medications    furosemide 20 MG Tabs  Commonly known as:  LASIX  Take 1 tablet (2

## 2018-06-01 ENCOUNTER — TELEPHONE (OUTPATIENT)
Dept: CARDIOLOGY UNIT | Facility: HOSPITAL | Age: 63
End: 2018-06-01

## 2018-06-08 ENCOUNTER — HOSPITAL ENCOUNTER (OUTPATIENT)
Dept: GENERAL RADIOLOGY | Facility: HOSPITAL | Age: 63
Discharge: HOME | End: 2018-06-08
Attending: INTERNAL MEDICINE
Payer: COMMERCIAL

## 2018-06-08 DIAGNOSIS — J90 PLEURAL EFFUSION: ICD-10-CM

## 2018-06-08 PROCEDURE — 71046 X-RAY EXAM CHEST 2 VIEWS: CPT | Performed by: INTERNAL MEDICINE

## 2018-06-08 NOTE — PROGRESS NOTES
Call patient - slight increase in right pleural effusion (not unexpected). Repeat CXR in 2 weeks.  Call if worsening symptoms

## 2018-06-11 ENCOUNTER — CARDPULM VISIT (OUTPATIENT)
Dept: CARDIAC REHAB | Facility: HOSPITAL | Age: 63
End: 2018-06-11
Attending: INTERNAL MEDICINE
Payer: COMMERCIAL

## 2018-06-11 DIAGNOSIS — Z95.2 S/P AVR: ICD-10-CM

## 2018-06-12 PROBLEM — F51.01 PRIMARY INSOMNIA: Status: ACTIVE | Noted: 2018-05-14

## 2018-06-12 PROBLEM — I35.0 NONRHEUMATIC AORTIC VALVE STENOSIS: Status: ACTIVE | Noted: 2018-05-14

## 2018-06-13 NOTE — PROGRESS NOTES
Patient contacted and was notified of results. He verbalized understanding. All questions answered. Patient denies any new or worsening symptoms. Will call if new or worsening symptoms. He agrees to have repeat chest xray done in 2 weeks.

## 2018-06-25 ENCOUNTER — CARDPULM VISIT (OUTPATIENT)
Dept: CARDIAC REHAB | Facility: HOSPITAL | Age: 63
End: 2018-06-25
Attending: INTERNAL MEDICINE
Payer: COMMERCIAL

## 2018-06-25 PROCEDURE — 93798 PHYS/QHP OP CAR RHAB W/ECG: CPT

## 2018-06-27 ENCOUNTER — HOSPITAL ENCOUNTER (OUTPATIENT)
Dept: GENERAL RADIOLOGY | Facility: HOSPITAL | Age: 63
Discharge: HOME | End: 2018-06-27
Attending: INTERNAL MEDICINE
Payer: COMMERCIAL

## 2018-06-27 ENCOUNTER — CARDPULM VISIT (OUTPATIENT)
Dept: CARDIAC REHAB | Facility: HOSPITAL | Age: 63
End: 2018-06-27
Attending: INTERNAL MEDICINE
Payer: COMMERCIAL

## 2018-06-27 DIAGNOSIS — J90 PLEURAL EFFUSION: ICD-10-CM

## 2018-06-27 PROCEDURE — 93798 PHYS/QHP OP CAR RHAB W/ECG: CPT

## 2018-06-27 PROCEDURE — 71046 X-RAY EXAM CHEST 2 VIEWS: CPT | Performed by: INTERNAL MEDICINE

## 2018-06-27 NOTE — PROGRESS NOTES
Call patient - CXR with small right pleural effusion that is improved compared to prior.  He should follow-up in August as already scheduled

## 2018-07-02 ENCOUNTER — CARDPULM VISIT (OUTPATIENT)
Dept: CARDIAC REHAB | Facility: HOSPITAL | Age: 63
End: 2018-07-02
Attending: INTERNAL MEDICINE
Payer: COMMERCIAL

## 2018-07-02 PROCEDURE — 93798 PHYS/QHP OP CAR RHAB W/ECG: CPT

## 2018-07-06 ENCOUNTER — CARDPULM VISIT (OUTPATIENT)
Dept: CARDIAC REHAB | Facility: HOSPITAL | Age: 63
End: 2018-07-06
Attending: INTERNAL MEDICINE
Payer: COMMERCIAL

## 2018-07-06 PROCEDURE — 93798 PHYS/QHP OP CAR RHAB W/ECG: CPT

## 2018-07-09 ENCOUNTER — CARDPULM VISIT (OUTPATIENT)
Dept: CARDIAC REHAB | Facility: HOSPITAL | Age: 63
End: 2018-07-09
Attending: INTERNAL MEDICINE
Payer: COMMERCIAL

## 2018-07-09 PROCEDURE — 93798 PHYS/QHP OP CAR RHAB W/ECG: CPT

## 2018-07-11 ENCOUNTER — CARDPULM VISIT (OUTPATIENT)
Dept: CARDIAC REHAB | Facility: HOSPITAL | Age: 63
End: 2018-07-11
Attending: INTERNAL MEDICINE
Payer: COMMERCIAL

## 2018-07-11 PROCEDURE — 93798 PHYS/QHP OP CAR RHAB W/ECG: CPT

## 2018-07-13 ENCOUNTER — CARDPULM VISIT (OUTPATIENT)
Dept: CARDIAC REHAB | Facility: HOSPITAL | Age: 63
End: 2018-07-13
Attending: INTERNAL MEDICINE
Payer: COMMERCIAL

## 2018-07-13 PROCEDURE — 93798 PHYS/QHP OP CAR RHAB W/ECG: CPT

## 2018-07-16 ENCOUNTER — CARDPULM VISIT (OUTPATIENT)
Dept: CARDIAC REHAB | Facility: HOSPITAL | Age: 63
End: 2018-07-16
Attending: INTERNAL MEDICINE
Payer: COMMERCIAL

## 2018-07-16 PROCEDURE — 93798 PHYS/QHP OP CAR RHAB W/ECG: CPT

## 2018-07-18 ENCOUNTER — CARDPULM VISIT (OUTPATIENT)
Dept: CARDIAC REHAB | Facility: HOSPITAL | Age: 63
End: 2018-07-18
Attending: INTERNAL MEDICINE
Payer: COMMERCIAL

## 2018-07-18 PROCEDURE — 93798 PHYS/QHP OP CAR RHAB W/ECG: CPT

## 2018-07-20 ENCOUNTER — CARDPULM VISIT (OUTPATIENT)
Dept: CARDIAC REHAB | Facility: HOSPITAL | Age: 63
End: 2018-07-20
Attending: INTERNAL MEDICINE
Payer: COMMERCIAL

## 2018-07-20 PROCEDURE — 93798 PHYS/QHP OP CAR RHAB W/ECG: CPT

## 2018-07-23 ENCOUNTER — CARDPULM VISIT (OUTPATIENT)
Dept: CARDIAC REHAB | Facility: HOSPITAL | Age: 63
End: 2018-07-23
Attending: INTERNAL MEDICINE
Payer: COMMERCIAL

## 2018-07-23 PROCEDURE — 93798 PHYS/QHP OP CAR RHAB W/ECG: CPT

## 2018-07-25 ENCOUNTER — CARDPULM VISIT (OUTPATIENT)
Dept: CARDIAC REHAB | Facility: HOSPITAL | Age: 63
End: 2018-07-25
Attending: INTERNAL MEDICINE
Payer: COMMERCIAL

## 2018-07-25 PROCEDURE — 93798 PHYS/QHP OP CAR RHAB W/ECG: CPT

## 2018-07-27 ENCOUNTER — CARDPULM VISIT (OUTPATIENT)
Dept: CARDIAC REHAB | Facility: HOSPITAL | Age: 63
End: 2018-07-27
Attending: INTERNAL MEDICINE
Payer: COMMERCIAL

## 2018-07-27 PROCEDURE — 93798 PHYS/QHP OP CAR RHAB W/ECG: CPT

## 2018-07-30 ENCOUNTER — CARDPULM VISIT (OUTPATIENT)
Dept: CARDIAC REHAB | Facility: HOSPITAL | Age: 63
End: 2018-07-30
Attending: INTERNAL MEDICINE
Payer: COMMERCIAL

## 2018-07-30 PROCEDURE — 93798 PHYS/QHP OP CAR RHAB W/ECG: CPT

## 2018-08-01 ENCOUNTER — CARDPULM VISIT (OUTPATIENT)
Dept: CARDIAC REHAB | Facility: HOSPITAL | Age: 63
End: 2018-08-01
Attending: INTERNAL MEDICINE
Payer: COMMERCIAL

## 2018-08-01 PROCEDURE — 93798 PHYS/QHP OP CAR RHAB W/ECG: CPT

## 2018-08-03 ENCOUNTER — CARDPULM VISIT (OUTPATIENT)
Dept: CARDIAC REHAB | Facility: HOSPITAL | Age: 63
End: 2018-08-03
Attending: INTERNAL MEDICINE
Payer: COMMERCIAL

## 2018-08-03 PROBLEM — J90 PLEURAL EFFUSION: Status: ACTIVE | Noted: 2018-08-03

## 2018-08-03 PROCEDURE — 93798 PHYS/QHP OP CAR RHAB W/ECG: CPT

## 2018-08-06 ENCOUNTER — CARDPULM VISIT (OUTPATIENT)
Dept: CARDIAC REHAB | Facility: HOSPITAL | Age: 63
End: 2018-08-06
Attending: INTERNAL MEDICINE
Payer: COMMERCIAL

## 2018-08-06 PROCEDURE — 93798 PHYS/QHP OP CAR RHAB W/ECG: CPT

## 2018-08-08 ENCOUNTER — CARDPULM VISIT (OUTPATIENT)
Dept: CARDIAC REHAB | Facility: HOSPITAL | Age: 63
End: 2018-08-08
Attending: INTERNAL MEDICINE
Payer: COMMERCIAL

## 2018-08-08 PROCEDURE — 93798 PHYS/QHP OP CAR RHAB W/ECG: CPT

## 2018-08-10 ENCOUNTER — APPOINTMENT (OUTPATIENT)
Dept: CARDIAC REHAB | Facility: HOSPITAL | Age: 63
End: 2018-08-10
Attending: INTERNAL MEDICINE
Payer: COMMERCIAL

## 2018-08-13 ENCOUNTER — APPOINTMENT (OUTPATIENT)
Dept: CARDIAC REHAB | Facility: HOSPITAL | Age: 63
End: 2018-08-13
Attending: INTERNAL MEDICINE
Payer: COMMERCIAL

## 2018-08-15 ENCOUNTER — CARDPULM VISIT (OUTPATIENT)
Dept: CARDIAC REHAB | Facility: HOSPITAL | Age: 63
End: 2018-08-15
Attending: INTERNAL MEDICINE
Payer: COMMERCIAL

## 2018-08-15 PROCEDURE — 93798 PHYS/QHP OP CAR RHAB W/ECG: CPT

## 2018-08-17 ENCOUNTER — CARDPULM VISIT (OUTPATIENT)
Dept: CARDIAC REHAB | Facility: HOSPITAL | Age: 63
End: 2018-08-17
Attending: INTERNAL MEDICINE
Payer: COMMERCIAL

## 2018-08-17 PROCEDURE — 93798 PHYS/QHP OP CAR RHAB W/ECG: CPT

## 2018-08-20 ENCOUNTER — CARDPULM VISIT (OUTPATIENT)
Dept: CARDIAC REHAB | Facility: HOSPITAL | Age: 63
End: 2018-08-20
Attending: INTERNAL MEDICINE
Payer: COMMERCIAL

## 2018-08-20 PROCEDURE — 93798 PHYS/QHP OP CAR RHAB W/ECG: CPT

## 2018-08-22 ENCOUNTER — CARDPULM VISIT (OUTPATIENT)
Dept: CARDIAC REHAB | Facility: HOSPITAL | Age: 63
End: 2018-08-22
Attending: INTERNAL MEDICINE
Payer: COMMERCIAL

## 2018-08-22 PROCEDURE — 93798 PHYS/QHP OP CAR RHAB W/ECG: CPT

## 2018-08-24 ENCOUNTER — CARDPULM VISIT (OUTPATIENT)
Dept: CARDIAC REHAB | Facility: HOSPITAL | Age: 63
End: 2018-08-24
Attending: INTERNAL MEDICINE
Payer: COMMERCIAL

## 2018-08-24 PROCEDURE — 93798 PHYS/QHP OP CAR RHAB W/ECG: CPT

## 2018-08-27 ENCOUNTER — CARDPULM VISIT (OUTPATIENT)
Dept: CARDIAC REHAB | Facility: HOSPITAL | Age: 63
End: 2018-08-27
Attending: INTERNAL MEDICINE
Payer: COMMERCIAL

## 2018-08-27 PROCEDURE — 93798 PHYS/QHP OP CAR RHAB W/ECG: CPT

## 2018-08-29 ENCOUNTER — CARDPULM VISIT (OUTPATIENT)
Dept: CARDIAC REHAB | Facility: HOSPITAL | Age: 63
End: 2018-08-29
Attending: INTERNAL MEDICINE
Payer: COMMERCIAL

## 2018-08-29 PROCEDURE — 93798 PHYS/QHP OP CAR RHAB W/ECG: CPT

## 2018-08-31 ENCOUNTER — CARDPULM VISIT (OUTPATIENT)
Dept: CARDIAC REHAB | Facility: HOSPITAL | Age: 63
End: 2018-08-31
Attending: INTERNAL MEDICINE
Payer: COMMERCIAL

## 2018-08-31 PROCEDURE — 93798 PHYS/QHP OP CAR RHAB W/ECG: CPT

## 2018-09-05 ENCOUNTER — HOSPITAL ENCOUNTER (OUTPATIENT)
Dept: GENERAL RADIOLOGY | Facility: HOSPITAL | Age: 63
Discharge: HOME | End: 2018-09-05
Attending: INTERNAL MEDICINE
Payer: COMMERCIAL

## 2018-09-05 DIAGNOSIS — J90 PLEURAL EFFUSION: ICD-10-CM

## 2018-09-05 PROCEDURE — 71046 X-RAY EXAM CHEST 2 VIEWS: CPT | Performed by: INTERNAL MEDICINE

## 2018-09-05 NOTE — PROGRESS NOTES
Call patient - CXR shows near complete resolution of right pleural effusion. No new recommendations.  Follow-up in one year

## 2018-09-07 ENCOUNTER — CARDPULM VISIT (OUTPATIENT)
Dept: CARDIAC REHAB | Facility: HOSPITAL | Age: 63
End: 2018-09-07
Attending: INTERNAL MEDICINE
Payer: COMMERCIAL

## 2018-09-07 PROCEDURE — 93798 PHYS/QHP OP CAR RHAB W/ECG: CPT

## 2018-09-10 ENCOUNTER — CARDPULM VISIT (OUTPATIENT)
Dept: CARDIAC REHAB | Facility: HOSPITAL | Age: 63
End: 2018-09-10
Attending: INTERNAL MEDICINE
Payer: COMMERCIAL

## 2018-09-10 PROCEDURE — 93798 PHYS/QHP OP CAR RHAB W/ECG: CPT

## 2018-09-12 ENCOUNTER — CARDPULM VISIT (OUTPATIENT)
Dept: CARDIAC REHAB | Facility: HOSPITAL | Age: 63
End: 2018-09-12
Attending: INTERNAL MEDICINE
Payer: COMMERCIAL

## 2018-09-12 PROCEDURE — 93798 PHYS/QHP OP CAR RHAB W/ECG: CPT

## 2018-09-14 ENCOUNTER — CARDPULM VISIT (OUTPATIENT)
Dept: CARDIAC REHAB | Facility: HOSPITAL | Age: 63
End: 2018-09-14
Attending: INTERNAL MEDICINE
Payer: COMMERCIAL

## 2018-09-17 ENCOUNTER — CARDPULM VISIT (OUTPATIENT)
Dept: CARDIAC REHAB | Facility: HOSPITAL | Age: 63
End: 2018-09-17
Attending: INTERNAL MEDICINE
Payer: COMMERCIAL

## 2018-09-17 PROCEDURE — 93798 PHYS/QHP OP CAR RHAB W/ECG: CPT

## 2018-09-19 ENCOUNTER — CARDPULM VISIT (OUTPATIENT)
Dept: CARDIAC REHAB | Facility: HOSPITAL | Age: 63
End: 2018-09-19
Attending: INTERNAL MEDICINE
Payer: COMMERCIAL

## 2018-09-19 PROCEDURE — 93798 PHYS/QHP OP CAR RHAB W/ECG: CPT

## 2018-09-21 ENCOUNTER — CARDPULM VISIT (OUTPATIENT)
Dept: CARDIAC REHAB | Facility: HOSPITAL | Age: 63
End: 2018-09-21
Attending: INTERNAL MEDICINE
Payer: COMMERCIAL

## 2018-09-21 PROCEDURE — 93798 PHYS/QHP OP CAR RHAB W/ECG: CPT

## 2018-09-24 ENCOUNTER — APPOINTMENT (OUTPATIENT)
Dept: CARDIAC REHAB | Facility: HOSPITAL | Age: 63
End: 2018-09-24
Attending: INTERNAL MEDICINE
Payer: COMMERCIAL

## 2018-10-03 ENCOUNTER — CARDPULM VISIT (OUTPATIENT)
Dept: CARDIAC REHAB | Facility: HOSPITAL | Age: 63
End: 2018-10-03
Attending: INTERNAL MEDICINE
Payer: COMMERCIAL

## 2018-10-03 PROCEDURE — 93798 PHYS/QHP OP CAR RHAB W/ECG: CPT

## 2018-10-08 ENCOUNTER — CARDPULM VISIT (OUTPATIENT)
Dept: CARDIAC REHAB | Facility: HOSPITAL | Age: 63
End: 2018-10-08
Attending: INTERNAL MEDICINE
Payer: COMMERCIAL

## 2018-10-08 PROCEDURE — 93798 PHYS/QHP OP CAR RHAB W/ECG: CPT

## 2018-12-06 NOTE — PLAN OF CARE
CARDIOVASCULAR - ADULT    • Maintains optimal cardiac output and hemodynamic stability Progressing    • Absence of cardiac arrhythmias or at baseline Progressing        COPING    • Pt/Family able to verbalize concerns and demonstrate effective coping strat HPI:   Kirsten Lopez is a 51 year old female usually seen in the endocrinology clinic for consultative follow-up and management of uncontrolled diabetes.  She was last seen 05/10/2018.  She has a long-standing history of type 2 diabetes, diagnosed more than 10 years ago, currently with complications of macular degeneration.  Her last dilated eye examination was in February 2018 and that is when she was found to have macular degeneration. She currently does not have any evidence of proliferative diabetic retinopathy.  She does not get any numbness or tingling in her feet to suggest diabetic.  She currently doesn't have any history or symptoms suggestive of coronary heart disease.  She also doesn't have any evidence of chronic kidney disease. Her creatinine on 08/31/2018, was 0.57 mg/dL with an estimated GFR of greater than 90.  For the treatment of her diabetes, she is currently on Lantus as 75 units at bedtime and prandial  NovoLog. The dose was increased in the last visit to 12 units before breakfast, 14 units before lunch and 16 before supper.  She however has not been using the prandial NovoLog consistently. She stated that she often misses the lunchtime NovoLog possibly 3-4 times a week.  She is also on metformin as 1000 mg twice daily and is tolerating that well without significant side effects and she is also on Bydureon as 2 mg once a week  She does self-monitoring blood glucose once daily and sometimes not at all.  She has not had any significant hypoglycemic episodes.  With what she is doing, her A1c today was 8.6% compared to 8.4% on 05/10/2018 and to 8.1% in 08/09/2018.    She has a history of hypertension for which she was initially managed on lisinopril but because she developed significant cough with that, she was switched to losartan with the addition of the amlodipine as 10 mg daily.  Her main concern on this visit today was that she is getting significant cough even with the losartan.  She ran out  of the losartan and has not used it in the last 4 days with significant improvement in the cough symptoms.  She also has had bronchospasm in the past, requiring use of bronchodilators, following an episode of pneumonia.  She is currently getting ankle swelling with her current dose of the amlodipine.    In the last visit she did complain of some weight gain.  Her Thyroid function tests done at the time, on 05/10/2018, were normal with a TSH of 2.20 uIU/mL and a free T4 of 1.4 ng/dL.  On further discussion with her today, it turns out that she does not sleep well at all at night and she also snores and has not been formally evaluated for obstructive sleep apnea.    Although she is on vitamin D supplementation, she is only taking a 1000 international units daily and she currently is not on B12 supplementation.       Past Medical History:   Diagnosis Date   • DM (diabetes mellitus), type 2, uncontrolled (CMS/[facility]) 2/9/2016   • DM (diabetes mellitus), type 2, uncontrolled with complications (CMS/[facility]) 7/14/2016   • DM type 2 (diabetes mellitus, type 2) (CMS/[facility])    • Dry skin 2/9/2016   • Exogenous obesity    • Hyperlipidemia    • Hypertension        History reviewed. No pertinent surgical history.    Social History     Tobacco Use   • Smoking status: Passive Smoke Exposure - Never Smoker   • Smokeless tobacco: Never Used   Substance Use Topics   • Alcohol use: Not on file   • Drug use: Not on file       Family History   Problem Relation Age of Onset   • Diabetes Father    • Cataracts Father    • Kidney disease Sister    • Diabetes Sister         pre-diabetic   • Diabetes Brother    • High blood pressure Brother    • Hypertension Brother    • Diabetes Brother    • Hypertension Brother    • Glaucoma Paternal Aunt    • Glaucoma Brother    • Hypertension Brother    • Diabetes Brother        ALLERGIES:   Allergen Reactions   • Amoxicillin RASH   • Ceftin [Cefuroxime Axetil] RASH   • Levaquin Other (See Comments)      abdomenial pain,nausea   • Lisinopril Cough   • Seafood   (Food) RASH       Current Outpatient Medications   Medication Sig Dispense Refill   • pravastatin (PRAVACHOL) 40 MG tablet TAKE 1 TABLET BY MOUTH ONCE DAILY 90 tablet 1   • amLODIPine (NORVASC) 10 MG tablet TAKE 1 TABLET BY MOUTH ONCE DAILY 90 tablet 0   • fenofibrate (TRICOR) 145 MG tablet TAKE 1 TABLET BY MOUTH ONCE DAILY 30 tablet 4   • insulin glargine (LANTUS SOLOSTAR) 100 UNIT/ML pen-injector Inject 75 units subcutaneously once daily in the evening 30 mL 5   • metformin (GLUCOPHAGE) 1000 MG tablet TAKE ONE TABLET BY MOUTH TWICE DAILY 180 tablet 3   • FREESTYLE LITE test strip TEST BLOOD SUGAR 4 TIMES DAILY 100 each 11   • insulin lispro (HUMALOG KWIKPEN) 100 UNIT/ML pen-injector Inject with meals three times per day: 12 units bkfst, 14 units lunch, 16 units supper + 1 for 50 >150. Max total 50 units/day. 15 mL 11   • BD PEN NEEDLE MALACHI U/F 32G X 4 MM Misc USE AS DIRECTED ONCE DAILY WITH  LANTUS 100 each 11   • BYDUREON 2 MG pen-injector INJECT 2 MGS INTO THE SKIN ONCE A WEEK 4 each 11   • losartan (COZAAR) 100 MG tablet Take 1 tablet by mouth daily. 90 tablet 3   • Lancets 28G Misc Tests qid, E11.8, E11.65, Freestyle meter 360 each 3   • MAGNESIUM OXIDE PO Take 1 tablet by mouth every other day. Uses for headaches     • Multiple Vitamins-Minerals (MULTIVITAMIN PO) Take 1 tablet by mouth daily.     • Ascorbic Acid (VITAMIN C) 1000 MG tablet Take 1,000 mg by mouth daily.     • cholecalciferol (VITAMIN D3) 1000 UNITS tablet Take 1,000 Units by mouth daily.     • Olopatadine HCl (PATADAY) 0.2 % ophthalmic solution Place 1 drop into both eyes daily. 5 mL 12     No current facility-administered medications for this visit.          Review of Systems:  Constitutional: As per hpi otherwise negative.  Eyes: Denies blurry vision, denies discharge.  Ears:  Denies discharge, denies ear pain.  Nose/Sinuses: Denies congestion, denies epistaxis.  Mouth/Throat: Denies  dysphagia, and denies soreness.  Neck: Denies pain, denies swollen glands.  Cardiovascular: Denies chest pain, denies palpitations.  Respiratory:   positive for cough, but denies shortness of breath and denies wheezing.  Gastrointestinal:  Denies abdominal pain, denies constipation, denies diarrhea.  Musculoskeletal: Denies cold extremities, denies muscle cramps.  Skin:  Denies dry skin and denies hair changes.  Neuro: Denies dizziness, denies headache, denies tingling and denies tremor.  Endocrine: As per hpi and denies goiter, denies hair loss, denies intolerance to cold, denies intolerance to heat, denies polydipsia, denies polyphagia, denies polyuria.    Examination:  Visit Vitals  /80   Pulse 84   Ht 5' 0.98\" (1.549 m)   Wt 120 kg   LMP 11/14/2018 (Approximate)   BMI 50.01 kg/m²     General: Alert, appearing age appropriate, in no distress. morbidly obese.   HEENT: Pupils normal.  Neck: Supple. No palpable thyroid enlargement or discrete nodules.  Lungs: Good ae b/l, lungs clear to auscultation bilaterally.  Heart: Regular rate and rhythm, s1s2.  Extremities/Musculoskeletal:  edema +1.  Skin: Normal skin turgor, no rashes.  Neurologic examination:  intact and nonfocal       ASSESSMENT:   1. Uncontrolled type 2 diabetes with complications as mentioned above.  2. Possible allergic reaction to losartan  3. Nonspecific symptoms of tiredness and weight gain, the cause of this currently is clear     RECOMMENDATIONS:   1. We reviewed with Ms. Lopez, her diabetic treatment, insulin doses and glycemic control.  2. We discussed the fact that there hadn't been any significant improvement in her glycemic control in spite of the increase in her prandial NovoLog doses in the last visit.  3. We therefore talked about the need to use the prandial NovoLog consistently.  4. We discussed the fact that it's important to get the diabetes under good control with an A1c as close to 6% as possible in order to prevent both acute  and chronic diabetic complications long-term.  5. We talked about foot care and the need to inspect her feet daily and to avoid walking outside the house without shoes.  6. We also reviewed the symptoms of hypoglycemia and how to address them.    7. We talked about safe driving measures and the need to check her blood sugars before she drives, the need to have a candy within reach while she is driving in case she should begin to have symptoms of a low blood sugar when she is not in a position to stop and the need to stop and check her blood sugars every 2 hours whenever she does long-distance driving.  8. We reviewed the symptoms of tiredness and weight gain and the possible connection to poor sleep and obstructive sleep apnea. We do recommend that she should talk to her primary care physician about referral for a sleep study and possibly use of a CPAP machine.  9. We advised her to increase her vitamin D intake to at least 5000 international units daily and to use over-the-counter sublingual /chewable B12 as 1000 µg daily as this tends to get depleted with prolonged use of metformin.  10. With regard to the hypertension, the problem is that she has not tolerated ACE inhibitors in the past and is currently not able to tolerate the losartan either. She also has a history of reactive airway disease in the past following pneumonia and is currently developing ankle swelling with the use of amlodipine. All that put together excludes 4 classes of possible antihypertensives that we could safely use on her.  11. Our recommendation therefore was for her make an appointment for her to see her primary care physician to explore the best treatment options for the elevated blood pressure.  12. She'll come back for follow-up in 3 months.  13. We gave her the opportunity to ask questions which we answered and we addressed her concerns.       Jonathan Hummel MD, FACE  Endocrinology

## 2019-02-18 PROCEDURE — 86803 HEPATITIS C AB TEST: CPT | Performed by: INTERNAL MEDICINE

## 2019-02-18 PROCEDURE — 81003 URINALYSIS AUTO W/O SCOPE: CPT | Performed by: INTERNAL MEDICINE

## 2019-02-19 PROBLEM — R79.89 AZOTEMIA: Status: RESOLVED | Noted: 2018-05-24 | Resolved: 2019-02-19

## 2019-02-19 PROBLEM — J90 PLEURAL EFFUSION: Status: RESOLVED | Noted: 2018-08-03 | Resolved: 2019-02-19

## 2019-02-19 PROBLEM — J94.2 HEMOTHORAX ON RIGHT: Status: RESOLVED | Noted: 2018-05-28 | Resolved: 2019-02-19

## 2019-02-19 PROBLEM — R06.02 SOB (SHORTNESS OF BREATH): Status: RESOLVED | Noted: 2018-05-24 | Resolved: 2019-02-19

## 2019-02-19 PROBLEM — R73.9 HYPERGLYCEMIA: Status: RESOLVED | Noted: 2018-05-24 | Resolved: 2019-02-19

## 2020-02-24 PROBLEM — R77.8 ELEVATED TROPONIN: Status: RESOLVED | Noted: 2018-05-25 | Resolved: 2020-02-24

## 2020-02-24 PROBLEM — I35.0 NONRHEUMATIC AORTIC VALVE STENOSIS: Status: RESOLVED | Noted: 2018-05-14 | Resolved: 2020-02-24

## 2020-02-24 PROBLEM — R00.2 PALPITATIONS: Status: RESOLVED | Noted: 2018-05-25 | Resolved: 2020-02-24

## 2020-02-24 PROBLEM — R79.89 ELEVATED TROPONIN: Status: RESOLVED | Noted: 2018-05-25 | Resolved: 2020-02-24

## 2021-06-14 PROBLEM — D64.9 ANEMIA, UNSPECIFIED TYPE: Status: RESOLVED | Noted: 2018-05-23 | Resolved: 2021-06-14

## 2021-06-14 PROBLEM — I51.89 LEFT VENTRICULAR DIASTOLIC DYSFUNCTION WITH PRESERVED SYSTOLIC FUNCTION: Status: ACTIVE | Noted: 2021-06-14

## 2021-06-14 PROBLEM — D69.6 THROMBOCYTOPENIA (HCC): Status: RESOLVED | Noted: 2018-05-23 | Resolved: 2021-06-14

## 2021-06-14 PROBLEM — D69.6 THROMBOCYTOPENIA: Status: RESOLVED | Noted: 2018-05-23 | Resolved: 2021-06-14

## 2021-06-14 PROBLEM — I48.92 ATRIAL FLUTTER, PAROXYSMAL (HCC): Status: RESOLVED | Noted: 2017-01-13 | Resolved: 2021-06-14

## 2021-11-29 ENCOUNTER — LAB ENCOUNTER (OUTPATIENT)
Dept: LAB | Age: 66
End: 2021-11-29
Attending: SURGERY
Payer: MEDICARE

## 2021-11-29 ENCOUNTER — NURSE ONLY (OUTPATIENT)
Dept: LAB | Age: 66
End: 2021-11-29
Attending: SURGERY
Payer: MEDICARE

## 2021-11-29 DIAGNOSIS — K40.90 LEFT INGUINAL HERNIA: ICD-10-CM

## 2021-11-29 PROCEDURE — 93010 ELECTROCARDIOGRAM REPORT: CPT | Performed by: SURGERY

## 2021-11-29 PROCEDURE — 93005 ELECTROCARDIOGRAM TRACING: CPT

## 2021-11-29 PROCEDURE — 36415 COLL VENOUS BLD VENIPUNCTURE: CPT

## 2021-11-29 PROCEDURE — 80048 BASIC METABOLIC PNL TOTAL CA: CPT

## 2021-12-02 ENCOUNTER — HOSPITAL ENCOUNTER (OUTPATIENT)
Facility: HOSPITAL | Age: 66
Setting detail: HOSPITAL OUTPATIENT SURGERY
Discharge: HOME | End: 2021-12-02
Attending: SURGERY | Admitting: SURGERY
Payer: MEDICARE

## 2021-12-02 ENCOUNTER — ANESTHESIA EVENT (OUTPATIENT)
Dept: SURGERY | Facility: HOSPITAL | Age: 66
End: 2021-12-02
Payer: MEDICARE

## 2021-12-02 ENCOUNTER — ANESTHESIA (OUTPATIENT)
Dept: SURGERY | Facility: HOSPITAL | Age: 66
End: 2021-12-02
Payer: MEDICARE

## 2021-12-02 VITALS
SYSTOLIC BLOOD PRESSURE: 130 MMHG | RESPIRATION RATE: 16 BRPM | BODY MASS INDEX: 24.09 KG/M2 | DIASTOLIC BLOOD PRESSURE: 86 MMHG | OXYGEN SATURATION: 94 % | WEIGHT: 162.63 LBS | HEIGHT: 69 IN | TEMPERATURE: 98 F | HEART RATE: 64 BPM

## 2021-12-02 DIAGNOSIS — K40.90 LEFT INGUINAL HERNIA: Primary | ICD-10-CM

## 2021-12-02 PROCEDURE — 8E0W4CZ ROBOTIC ASSISTED PROCEDURE OF TRUNK REGION, PERCUTANEOUS ENDOSCOPIC APPROACH: ICD-10-PCS | Performed by: SURGERY

## 2021-12-02 PROCEDURE — 0YU64JZ SUPPLEMENT LEFT INGUINAL REGION WITH SYNTHETIC SUBSTITUTE, PERCUTANEOUS ENDOSCOPIC APPROACH: ICD-10-PCS | Performed by: SURGERY

## 2021-12-02 DEVICE — LAPAROSCOPIC SELF-FIXATING MESH POLYESTER WITH POLYLACTIC ACID GRIPS AND COLLAGEN FILM
Type: IMPLANTABLE DEVICE | Site: GROIN | Status: FUNCTIONAL
Brand: PROGRIP

## 2021-12-02 RX ORDER — HYDROMORPHONE HYDROCHLORIDE 1 MG/ML
INJECTION, SOLUTION INTRAMUSCULAR; INTRAVENOUS; SUBCUTANEOUS
Status: COMPLETED
Start: 2021-12-02 | End: 2021-12-02

## 2021-12-02 RX ORDER — ACETAMINOPHEN 500 MG
1000 TABLET ORAL ONCE AS NEEDED
Status: DISCONTINUED | OUTPATIENT
Start: 2021-12-02 | End: 2021-12-02

## 2021-12-02 RX ORDER — ACETAMINOPHEN 500 MG
1000 TABLET ORAL ONCE
Status: DISCONTINUED | OUTPATIENT
Start: 2021-12-02 | End: 2021-12-02

## 2021-12-02 RX ORDER — DIPHENHYDRAMINE HYDROCHLORIDE 50 MG/ML
12.5 INJECTION INTRAMUSCULAR; INTRAVENOUS AS NEEDED
Status: DISCONTINUED | OUTPATIENT
Start: 2021-12-02 | End: 2021-12-02

## 2021-12-02 RX ORDER — MEPERIDINE HYDROCHLORIDE 25 MG/ML
12.5 INJECTION INTRAMUSCULAR; INTRAVENOUS; SUBCUTANEOUS AS NEEDED
Status: DISCONTINUED | OUTPATIENT
Start: 2021-12-02 | End: 2021-12-02

## 2021-12-02 RX ORDER — HYDROCODONE BITARTRATE AND ACETAMINOPHEN 5; 325 MG/1; MG/1
2 TABLET ORAL AS NEEDED
Status: DISCONTINUED | OUTPATIENT
Start: 2021-12-02 | End: 2021-12-02

## 2021-12-02 RX ORDER — ONDANSETRON 2 MG/ML
INJECTION INTRAMUSCULAR; INTRAVENOUS AS NEEDED
Status: DISCONTINUED | OUTPATIENT
Start: 2021-12-02 | End: 2021-12-02 | Stop reason: SURG

## 2021-12-02 RX ORDER — SODIUM CHLORIDE, SODIUM LACTATE, POTASSIUM CHLORIDE, CALCIUM CHLORIDE 600; 310; 30; 20 MG/100ML; MG/100ML; MG/100ML; MG/100ML
INJECTION, SOLUTION INTRAVENOUS CONTINUOUS
Status: DISCONTINUED | OUTPATIENT
Start: 2021-12-02 | End: 2021-12-02

## 2021-12-02 RX ORDER — LABETALOL HYDROCHLORIDE 5 MG/ML
5 INJECTION, SOLUTION INTRAVENOUS EVERY 5 MIN PRN
Status: DISCONTINUED | OUTPATIENT
Start: 2021-12-02 | End: 2021-12-02

## 2021-12-02 RX ORDER — DEXAMETHASONE SODIUM PHOSPHATE 4 MG/ML
VIAL (ML) INJECTION AS NEEDED
Status: DISCONTINUED | OUTPATIENT
Start: 2021-12-02 | End: 2021-12-02 | Stop reason: SURG

## 2021-12-02 RX ORDER — HYDROMORPHONE HYDROCHLORIDE 1 MG/ML
0.4 INJECTION, SOLUTION INTRAMUSCULAR; INTRAVENOUS; SUBCUTANEOUS EVERY 5 MIN PRN
Status: DISCONTINUED | OUTPATIENT
Start: 2021-12-02 | End: 2021-12-02

## 2021-12-02 RX ORDER — CEFAZOLIN SODIUM/WATER 2 G/20 ML
2 SYRINGE (ML) INTRAVENOUS ONCE
Status: COMPLETED | OUTPATIENT
Start: 2021-12-02 | End: 2021-12-02

## 2021-12-02 RX ORDER — ACETAMINOPHEN 500 MG
1000 TABLET ORAL ONCE
COMMUNITY

## 2021-12-02 RX ORDER — NALOXONE HYDROCHLORIDE 0.4 MG/ML
80 INJECTION, SOLUTION INTRAMUSCULAR; INTRAVENOUS; SUBCUTANEOUS AS NEEDED
Status: DISCONTINUED | OUTPATIENT
Start: 2021-12-02 | End: 2021-12-02

## 2021-12-02 RX ORDER — HEPARIN SODIUM 5000 [USP'U]/ML
5000 INJECTION, SOLUTION INTRAVENOUS; SUBCUTANEOUS ONCE
Status: COMPLETED | OUTPATIENT
Start: 2021-12-02 | End: 2021-12-02

## 2021-12-02 RX ORDER — LIDOCAINE HYDROCHLORIDE 10 MG/ML
INJECTION, SOLUTION EPIDURAL; INFILTRATION; INTRACAUDAL; PERINEURAL AS NEEDED
Status: DISCONTINUED | OUTPATIENT
Start: 2021-12-02 | End: 2021-12-02 | Stop reason: SURG

## 2021-12-02 RX ORDER — METOCLOPRAMIDE HYDROCHLORIDE 5 MG/ML
10 INJECTION INTRAMUSCULAR; INTRAVENOUS AS NEEDED
Status: DISCONTINUED | OUTPATIENT
Start: 2021-12-02 | End: 2021-12-02

## 2021-12-02 RX ORDER — TRAMADOL HYDROCHLORIDE 50 MG/1
TABLET ORAL EVERY 4 HOURS PRN
Qty: 10 TABLET | Refills: 0 | Status: SHIPPED | OUTPATIENT
Start: 2021-12-02

## 2021-12-02 RX ORDER — BUPIVACAINE HYDROCHLORIDE AND EPINEPHRINE 5; 5 MG/ML; UG/ML
INJECTION, SOLUTION EPIDURAL; INTRACAUDAL; PERINEURAL AS NEEDED
Status: DISCONTINUED | OUTPATIENT
Start: 2021-12-02 | End: 2021-12-02 | Stop reason: HOSPADM

## 2021-12-02 RX ORDER — HYDROCODONE BITARTRATE AND ACETAMINOPHEN 5; 325 MG/1; MG/1
1 TABLET ORAL AS NEEDED
Status: DISCONTINUED | OUTPATIENT
Start: 2021-12-02 | End: 2021-12-02

## 2021-12-02 RX ORDER — GLYCOPYRROLATE 0.2 MG/ML
INJECTION, SOLUTION INTRAMUSCULAR; INTRAVENOUS AS NEEDED
Status: DISCONTINUED | OUTPATIENT
Start: 2021-12-02 | End: 2021-12-02 | Stop reason: SURG

## 2021-12-02 RX ORDER — ONDANSETRON 2 MG/ML
4 INJECTION INTRAMUSCULAR; INTRAVENOUS AS NEEDED
Status: DISCONTINUED | OUTPATIENT
Start: 2021-12-02 | End: 2021-12-02

## 2021-12-02 RX ORDER — NEOSTIGMINE METHYLSULFATE 1 MG/ML
INJECTION INTRAVENOUS AS NEEDED
Status: DISCONTINUED | OUTPATIENT
Start: 2021-12-02 | End: 2021-12-02 | Stop reason: SURG

## 2021-12-02 RX ORDER — ROCURONIUM BROMIDE 10 MG/ML
INJECTION, SOLUTION INTRAVENOUS AS NEEDED
Status: DISCONTINUED | OUTPATIENT
Start: 2021-12-02 | End: 2021-12-02 | Stop reason: SURG

## 2021-12-02 RX ADMIN — DEXAMETHASONE SODIUM PHOSPHATE 8 MG: 4 MG/ML VIAL (ML) INJECTION at 07:39:00

## 2021-12-02 RX ADMIN — ONDANSETRON 4 MG: 2 INJECTION INTRAMUSCULAR; INTRAVENOUS at 09:07:00

## 2021-12-02 RX ADMIN — LIDOCAINE HYDROCHLORIDE 100 MG: 10 INJECTION, SOLUTION EPIDURAL; INFILTRATION; INTRACAUDAL; PERINEURAL at 07:38:00

## 2021-12-02 RX ADMIN — GLYCOPYRROLATE 0.3 MG: 0.2 INJECTION, SOLUTION INTRAMUSCULAR; INTRAVENOUS at 08:13:00

## 2021-12-02 RX ADMIN — SODIUM CHLORIDE, SODIUM LACTATE, POTASSIUM CHLORIDE, CALCIUM CHLORIDE: 600; 310; 30; 20 INJECTION, SOLUTION INTRAVENOUS at 07:34:00

## 2021-12-02 RX ADMIN — CEFAZOLIN SODIUM/WATER 2 G: 2 G/20 ML SYRINGE (ML) INTRAVENOUS at 07:56:00

## 2021-12-02 RX ADMIN — NEOSTIGMINE METHYLSULFATE 4 MG: 1 INJECTION INTRAVENOUS at 09:11:00

## 2021-12-02 RX ADMIN — GLYCOPYRROLATE 0.7 MG: 0.2 INJECTION, SOLUTION INTRAMUSCULAR; INTRAVENOUS at 09:11:00

## 2021-12-02 RX ADMIN — ROCURONIUM BROMIDE 50 MG: 10 INJECTION, SOLUTION INTRAVENOUS at 07:38:00

## 2021-12-02 NOTE — INTERVAL H&P NOTE
Pre-op Diagnosis: Left inguinal hernia [K40.90]    The above referenced H&P was reviewed by Nilda Vilchis MD on 12/2/2021, the patient was examined and no significant changes have occurred in the patient's condition since the H&P was performed.   I discusse

## 2021-12-02 NOTE — ANESTHESIA POSTPROCEDURE EVALUATION
[facility]    Nery Hdez Patient Status:  Hospital Outpatient Surgery   Age/Gender 78year old male MRN [id number]   Location [address] [place] [address] Attending Taqueria Brown MD   [facility] Day # 0 PCP MD Sarah Beth Garrison Junior

## 2021-12-02 NOTE — OPERATIVE REPORT
[address] [facility] REPORT     Patient Name:  Arsh Aldrich  MRN: [id number]    Date of Operation:  2021  Site: [facility]    : 1955        PREOPERATIVE DIAGNOSIS: Left inguinal hernia.      POSTOPERATIVE DIAGNOSIS:  Left inguinal laparoscopic vision. The abdomen and pelvis were inspected. The visualized peritoneal surfaces, omentum, and intestines were unremarkable. The patient was placed in Trendelenburg position. The pelvis was visualized.   The patient had a direct left ingui \"doom\". Satisfied with the mesh positioning and fixation, the peritoneum was closed w he absence of inadvertent injury. There were no peritoneal defects.   The mesh was visualized through the peritoneum in excellent position without shifting or wrinklin

## 2021-12-02 NOTE — ANESTHESIA PROCEDURE NOTES
Airway  Date/Time: 12/2/2021 7:38 AM  Urgency: elective      General Information and Staff    Patient location during procedure: OR  Anesthesiologist: Radha aGrcia MD  Performed: anesthesiologist     Indications and Patient Condition  Indications for

## 2021-12-02 NOTE — ANESTHESIA PREPROCEDURE EVALUATION
PRE-OP EVALUATION    Patient Name: Jenifer Mejia    Admit Diagnosis: Left inguinal hernia [K40.90]    Pre-op Diagnosis: Left inguinal hernia [K40.90]    XI ROBOT-ASSISTED LAPAROSCOPIC LEFT INGUINAL HERNIA REPAIR WITH MESH     Anesthesia Procedure: Dari Teixeira 81 MG Oral Tab, Take 1 tablet (81 mg total) by mouth daily. , Disp: 90 tablet, Rfl: 3, 11/29/2021        Allergies: Altace [Ramipril], Celexa [Citalopram], and Rozerem [Ramelteon]      Anesthesia Evaluation    Patient summary reviewed.     Anesthetic Complic ventricle: The cavity size is normal. Wall thickness is mildly      increased. Systolic function is normal. The estimated ejection fraction      is 60-65%. Wall motion is normal; there are no regional wall motion      abnormalities.  There is diastolic dysf

## 2021-12-02 NOTE — H&P
HPI:  He presents in referral from Dr. Margarita Diggs for evaluation of a left inguinal hernia.       He noticed a bulge in the left groin area for 3 yrs.   No pain or incarc.     PSH:  vasectomy, AVR, bovine, on Eliquis  Occ:  Retired     Has c-scope pending, denies shortness of breath, wheezing or cough   CARDIOVASCULAR: denies chest pain or VILLATORO; no palpitations   GI: denies nausea, vomiting, constipation, diarrhea; no rectal bleeding; no heartburn  GENITAL/: no dysuria, urgency or frequency; bulging in the

## 2023-02-28 NOTE — PROGRESS NOTES
Assessment and Plan:     1. Bioprosthetic AVR for AS 5/14/18 + GEMMA clip  2. Paroxysmal atrial flutter  - one episode in 3/15 while on vacation--some ETOH. No recurrence  - presently SR  3. HTN  4.  ART    PLAN:  - lines out  - amio protocol  - diurese HISTORY OF PRESENT ILLNESS  Jennifer Guevara is a 61 y.o. female. 10/2019  Patient is seen today for new patient evaluation. She is referred here for palpitation. She has a history of hypertension, hyperlipidemia. 3/2022  Patient seen today for follow-up. She has noted increased palpitation. She is not using CPAP. Denies any chest pain. 4/2022  Patient seen in f/u for palpitations and echocardiogram results. She reports symptoms are improved with addition of beta blocker. She denies chest pain, shortness of breath or edema. Follow-up  The history is provided by the patient and medical records. Valvular Heart Disease  The history is provided by the patient. This is a chronic problem. The problem occurs constantly. The problem has not changed since onset. Hypertension  The history is provided by the patient. This is a chronic problem. The problem occurs constantly. The problem has not changed since onset. Palpitations   The history is provided by the patient. This is a recurrent problem. Episode onset: 2 months. The problem has been gradually worsening. The problem occurs every several days. The problem is associated with nothing. Pertinent negatives include no fever, no claudication, no orthopnea, no PND, no nausea, no vomiting, no dizziness, no weakness, no cough, no hemoptysis and no sputum production. Her past medical history is significant for hypertension. Review of Systems   Constitutional: Negative for chills and fever. HENT: Negative for nosebleeds. Eyes: Negative for blurred vision and double vision. Respiratory: Negative for cough, hemoptysis, sputum production and wheezing. Cardiovascular: Positive for palpitations. Negative for orthopnea, claudication, leg swelling and PND. Gastrointestinal: Negative for heartburn, nausea and vomiting. Musculoskeletal: Negative for myalgias. Skin: Negative for rash. Neurological: Negative for dizziness and weakness. 5/15/2018  ECG Report  Interpretation  -------------------------- Sinus Bradycardia - occasional atrial pacing . -consider inferolateral injury.  ABNORMAL When compared with ECG of 05/14/2018 12:14:48 there is no ventricular pacing present Electronically si Endo/Heme/Allergies: Does not bruise/bleed easily. Family History   Problem Relation Age of Onset    Stroke Brother     Heart Disease Maternal Aunt         chf    Hypertension Father     Glaucoma Mother     Heart Failure Maternal Aunt         MI    Colon Cancer Other     Stroke Other     Heart Failure Other         MI    Diabetes Maternal Uncle     Hypertension Mother        Past Medical History:   Diagnosis Date    Allergy     Anxiety 4/24/2010    Essential hypertension     HTN (hypertension) 4/24/2010    Hyperlipemia 4/24/2010    Hypokalemia 4/24/2010       Past Surgical History:   Procedure Laterality Date    APPENDECTOMY  1979    HYSTERECTOMY (CERVIX STATUS UNKNOWN)  05/07/08    partial       Social History     Tobacco Use    Smoking status: Never    Smokeless tobacco: Never   Substance Use Topics    Alcohol use: No     Alcohol/week: 0.0 standard drinks       Allergies   Allergen Reactions    Amlodipine Other (See Comments)     Tingling in back of head    Hydrochlorothiazide Other (See Comments)     TINGLING IN BACK OF HEAD       Prior to Admission medications    Medication Sig Start Date End Date Taking? Authorizing Provider   metoprolol succinate (TOPROL XL) 25 MG extended release tablet TAKE 1 TABLET BY MOUTH EVERY DAY 2/28/23  Yes Imani Jha MD   atorvastatin (LIPITOR) 20 MG tablet Take 20 mg by mouth daily 9/29/22  Yes Ar Automatic Reconciliation   Cholecalciferol 50 MCG (2000 UT) CAPS 1 cap daily 10/26/12  Yes Ar Automatic Reconciliation   losartan (COZAAR) 100 MG tablet Take 100 mg by mouth daily 8/25/22  Yes Ar Automatic Reconciliation   metFORMIN (GLUCOPHAGE-XR) 500 MG extended release tablet TAKE 1 TABLET BY MOUTH EVERY DAY WITH DINNER 9/30/22  Yes Ar Automatic Reconciliation         /60   Pulse 69   Ht 5' 2\" (1.575 m)   Wt 251 lb (113.9 kg)   SpO2 98%   BMI 45.91 kg/m²     Physical Exam  Constitutional:       Appearance: She is well-developed.    HENT:      Head: Normocephalic and atraumatic. Eyes:      Conjunctiva/sclera: Conjunctivae normal.   Neck:      Thyroid: No thyromegaly. Vascular: No JVD. Trachea: No tracheal deviation. Cardiovascular:      Rate and Rhythm: Normal rate and regular rhythm. Chest Wall: PMI is not displaced. Pulses: No decreased pulses. Heart sounds: No murmur heard. No gallop. No S3 sounds. Pulmonary:      Effort: No respiratory distress. Breath sounds: No wheezing or rales. Chest:      Chest wall: No tenderness. Abdominal:      Palpations: Abdomen is soft. Tenderness: There is no abdominal tenderness. Musculoskeletal:      Cervical back: Neck supple. Skin:     General: Skin is warm. Neurological:      Mental Status: She is alert and oriented to person, place, and time. Ms. Mehul Burns has a reminder for a \"due or due soon\" health maintenance. I have asked that she contact her primary care provider for follow-up on this health maintenance. I have personally reviewed patient's records available from hospital and other providers and incorporated findings in patient care. Notes,lab,old ekg,vascular study  I Have personally reviewed recent relevant labs available and discussed with patient  Interpretation Summary 11/2019       Left Ventricle: Normal cavity size, wall thickness, systolic function (ejection fraction normal) and diastolic function. Estimated left ventricular ejection fraction is 56 - 60%. Right Ventricle: Normal right ventricular size and function. Mitral Valve: Mild mitral valve regurgitation is present. Tricuspid Valve: Mild tricuspid valve regurgitation is present. Pulmonary Artery: There is no evidence of pulmonary hypertension. Interpretation Summary    Left Ventricle: Normal left ventricular systolic function with a visually estimated EF of 55 - 60%. Left ventricle size is normal. Normal wall thickness. Normal wall motion. Normal diastolic function.     Technical qualifiers: Echo study was technically difficult due to patient's body habitus  Comparison Study Information  Prior Study  There is a prior study available for comparison. Prior study date: 11/6/2019. As compared to the previous study, there are no significant changes. Echo Findings  Left Ventricle Normal left ventricular systolic function with a visually estimated EF of 55 - 60%. Left ventricle size is normal. Normal wall thickness. Normal wall motion. Normal diastolic function. Left Atrium Left atrium size is normal. LA Vol Index A/L is 26 mL/m2. Right Ventricle Right ventricle size is normal. Normal wall thickness. Normal systolic function. Right Atrium Right atrium size is normal.   Aortic Valve Valve structure is normal. No regurgitation. No stenosis. Mitral Valve Valve structure is normal. No regurgitation. No stenosis noted. Tricuspid Valve Valve structure is normal. No regurgitation. No stenosis noted. Pulmonic Valve The pulmonic valve visualization is suboptimal but appears to be functioning normally. Pulmonary Artery Pulmonary hypertension not present. Aorta Normal sized annulus. IVC/Hepatic Veins IVC diameter is less than or equal to 21 mm and decreases greater than 50% during inspiration; therefore the estimated right atrial pressure is normal (~3 mmHg). Pericardium No pericardial effusion. 11/2019  Event monitor-sinus rhythm no significant arrhythmia     I Have personally reviewed recent relevant labs available and discussed with patient  For 2020-CBC, BMP, LFT, lipid, TSH  12/2020-CBC, BMP, LFT lipid and TSH  EKG-12/2020-sinus rhythm  2/2023-CBC, lipid, CMP    No flowsheet data found. Assessment        Diagnosis Orders   1. Nonrheumatic mitral (valve) insufficiency      Stable continue treatment monitor      2. Essential (primary) hypertension      Controlled continue current treatment      3. Obstructive sleep apnea (adult) (pediatric)      Continue treatment monitor with PCP      4. Morbid (severe) obesity due to excess calories (HCC)      Continue dietary modification and exercise      5. Hyperlipidemia, unspecified hyperlipidemia type      LDL is mildly increased continue diet modification and current treatment          Medications Discontinued During This Encounter   Medication Reason    metoprolol succinate (TOPROL XL) 25 MG extended release tablet REORDER         Follow-up and Dispositions    Return in about 6 months (around 8/28/2023). 8/2020  Cardiac status stable. Symptoms are improving. Now with new onset diabetes on treatment. 2/2021  Patient with recent increase in palpitation that are improving we will continue to monitor clinically. Event monitor last year did not show any significant arrhythmia. Blood pressure stable. With dietary modification she has seen significant weight loss  9/2021  Cardiac status stable. Continue current treatment. Dietary modification  3/2022  Symptoms increased palpitation. Add Toprol 25 mg a day and monitor. Check echo for valvular disease and PA pressure. Depending on symptom improvement consider continuation of Toprol  4/2022  Palpitations have resolved with Toprol XL. Echo reviewed and discussed with patient, with normal LV function, no evidence of pulmonary hypertension. Advised patient to resume wearing CPAP nightly. Will refer to pulmonary for treatment of obstructive sleep apnea. Weight loss efforts encouraged. 2/2023  Cardiac status stable continue current medical management monitor.

## 2023-05-10 ENCOUNTER — LAB ENCOUNTER (OUTPATIENT)
Dept: LAB | Age: 68
End: 2023-05-10
Attending: INTERNAL MEDICINE
Payer: MEDICARE

## 2023-05-10 DIAGNOSIS — Z01.818 PREOPERATIVE EXAMINATION, UNSPECIFIED: Primary | ICD-10-CM

## 2023-05-10 LAB
ANION GAP SERPL CALC-SCNC: 4 MMOL/L (ref 0–18)
BUN BLD-MCNC: 28 MG/DL (ref 7–18)
CALCIUM BLD-MCNC: 9.9 MG/DL (ref 8.5–10.1)
CHLORIDE SERPL-SCNC: 107 MMOL/L (ref 98–112)
CO2 SERPL-SCNC: 27 MMOL/L (ref 21–32)
CREAT BLD-MCNC: 0.79 MG/DL
FASTING STATUS PATIENT QL REPORTED: YES
GFR SERPLBLD BASED ON 1.73 SQ M-ARVRAT: 96 ML/MIN/1.73M2 (ref 60–?)
GLUCOSE BLD-MCNC: 107 MG/DL (ref 70–99)
OSMOLALITY SERPL CALC.SUM OF ELEC: 292 MOSM/KG (ref 275–295)
POTASSIUM SERPL-SCNC: 4.4 MMOL/L (ref 3.5–5.1)
SODIUM SERPL-SCNC: 138 MMOL/L (ref 136–145)

## 2023-05-10 PROCEDURE — 80048 BASIC METABOLIC PNL TOTAL CA: CPT

## 2023-05-10 PROCEDURE — 36415 COLL VENOUS BLD VENIPUNCTURE: CPT

## 2023-05-10 NOTE — H&P
Pt seen and examined, chart reviewed. Agree with above note. No interval change in status.     Efrain Raya MD  [facility]  Cardiac Electrophysiolgy  [phone]

## 2023-05-11 RX ORDER — SOTALOL HYDROCHLORIDE 80 MG/1
80 TABLET ORAL 2 TIMES DAILY
COMMUNITY

## 2023-05-12 ENCOUNTER — HOSPITAL ENCOUNTER (OUTPATIENT)
Dept: INTERVENTIONAL RADIOLOGY/VASCULAR | Facility: HOSPITAL | Age: 68
Discharge: HOME | End: 2023-05-12
Attending: INTERNAL MEDICINE | Admitting: INTERNAL MEDICINE
Payer: MEDICARE

## 2023-05-12 VITALS
OXYGEN SATURATION: 96 % | HEART RATE: 55 BPM | HEIGHT: 69 IN | TEMPERATURE: 97 F | RESPIRATION RATE: 21 BRPM | BODY MASS INDEX: 24.73 KG/M2 | DIASTOLIC BLOOD PRESSURE: 98 MMHG | WEIGHT: 167 LBS | SYSTOLIC BLOOD PRESSURE: 147 MMHG

## 2023-05-12 DIAGNOSIS — I48.91 A-FIB (HCC): ICD-10-CM

## 2023-05-12 PROCEDURE — 93010 ELECTROCARDIOGRAM REPORT: CPT | Performed by: INTERNAL MEDICINE

## 2023-05-12 PROCEDURE — 92960 CARDIOVERSION ELECTRIC EXT: CPT | Performed by: INTERNAL MEDICINE

## 2023-05-12 PROCEDURE — 5A2204Z RESTORATION OF CARDIAC RHYTHM, SINGLE: ICD-10-PCS | Performed by: INTERNAL MEDICINE

## 2023-05-12 PROCEDURE — 93005 ELECTROCARDIOGRAM TRACING: CPT

## 2023-05-12 RX ORDER — SODIUM CHLORIDE 9 MG/ML
INJECTION, SOLUTION INTRAVENOUS CONTINUOUS
Status: DISCONTINUED | OUTPATIENT
Start: 2023-05-12 | End: 2023-05-12

## 2023-05-12 RX ADMIN — SODIUM CHLORIDE: 9 INJECTION, SOLUTION INTRAVENOUS at 11:30:00

## 2023-05-12 RX ADMIN — Medication 50 MG: at 12:14:00

## 2023-05-12 NOTE — PROGRESS NOTES
Successful cardioversion at bedside with Dr Braxton Coy. Pt tolerated procedure well. See bedside sedation record. Neuro intact. CASAS well. Pt tolerated po intake well. After recovery, IV was removed with catheter intact. Reviewed discharge instructions with pt and wife, verbalizes understanding. Home in stable condition without c/o. Pts wife is the .

## 2023-05-12 NOTE — PROCEDURES
PROCEDURE(S) PERFORMED:    1. Cardioversion. 2.     Sedation     :  Daniela Cherry MD     ANESTHESIA:  IV sedation. INDICATION:  Persistent atrial fibrillation. COMPLICATIONS:  None. METHODS:  The patient was brought to the outpatient cardiac telemetry unit in a fasting and nonsedated state after providing informed consent. IV sedation was administered during continuous ECG, pulse oximeter and noninvasive hemodynamic monitoring. After administering a total of 50 mg of IV brevital in intermittent boluses, the desired level of sedation was achieved. A single 200-joule synchronized biphasic shock was delivered with successful conversion to sinus rhythm. The patient remained on telemetry until she was fully recovered. There were no complications. CONCLUSIONS:  1. Successful cardioversion. ________________________________  Nisa Barragan.  Pawle Mak MD

## 2023-05-12 NOTE — DISCHARGE INSTRUCTIONS
HOME CARE INSTRUCTIONS FOLLOWING CARDIOVERSION OR ICD EVALUATION    ACTIVITY:  - DO NOT drive after the procedure. You may resume driving after 24 hours. - DO NOT operate any heavy machinery for 24 hours (Including kitchen appliances or power tools)   - Avoid drinking alcohol for 24 hours. - Rest today and resume your normal activities tomorrow. WHAT IS NORMAL:   -Your skin may be red where the patches were placed.  -The redness may last 2-3 days and feel like \"Sunburn\"  -You may treat the area with an over-the- counter cream that would be used for sunburned skin. SPECIAL INSTRUCTIONS:  -IF you are taking the medication Eliquis  or Coumadin or other blood thinning medication, it is very important that you continue taking until your follow up appointment with you physician.     OTHER:  -You may resume your present diet and medications unless otherwise specified by you physician.  -A list of medications was provided to you at discharge.  -Follow up with Dr Reynaldo Salazar office on 5/25 at 2:00PM

## 2023-05-13 LAB
ATRIAL RATE: 44 BPM
P AXIS: 63 DEGREES
P-R INTERVAL: 170 MS
Q-T INTERVAL: 512 MS
QRS DURATION: 94 MS
QTC CALCULATION (BEZET): 437 MS
R AXIS: 0 DEGREES
T AXIS: 47 DEGREES
VENTRICULAR RATE: 44 BPM

## 2023-06-01 ENCOUNTER — HOSPITAL ENCOUNTER (OUTPATIENT)
Dept: INTERVENTIONAL RADIOLOGY/VASCULAR | Facility: HOSPITAL | Age: 68
Discharge: HOME | End: 2023-06-01
Attending: NURSE PRACTITIONER | Admitting: INTERNAL MEDICINE
Payer: MEDICARE

## 2023-06-01 VITALS
BODY MASS INDEX: 25.18 KG/M2 | HEIGHT: 69 IN | HEART RATE: 43 BPM | DIASTOLIC BLOOD PRESSURE: 74 MMHG | RESPIRATION RATE: 12 BRPM | TEMPERATURE: 96 F | WEIGHT: 170 LBS | SYSTOLIC BLOOD PRESSURE: 128 MMHG | OXYGEN SATURATION: 96 %

## 2023-06-01 DIAGNOSIS — I48.91 ATRIAL FIBRILLATION, UNSPECIFIED TYPE (HCC): ICD-10-CM

## 2023-06-01 LAB
ATRIAL RATE: 27 BPM
P AXIS: 50 DEGREES
P-R INTERVAL: 192 MS
Q-T INTERVAL: 514 MS
QRS DURATION: 84 MS
QTC CALCULATION (BEZET): 468 MS
R AXIS: 0 DEGREES
T AXIS: 17 DEGREES
VENTRICULAR RATE: 50 BPM

## 2023-06-01 PROCEDURE — 93005 ELECTROCARDIOGRAM TRACING: CPT

## 2023-06-01 PROCEDURE — 5A2204Z RESTORATION OF CARDIAC RHYTHM, SINGLE: ICD-10-PCS | Performed by: INTERNAL MEDICINE

## 2023-06-01 PROCEDURE — 92960 CARDIOVERSION ELECTRIC EXT: CPT | Performed by: INTERNAL MEDICINE

## 2023-06-01 PROCEDURE — 93010 ELECTROCARDIOGRAM REPORT: CPT | Performed by: INTERNAL MEDICINE

## 2023-06-01 RX ORDER — SOTALOL HYDROCHLORIDE 80 MG/1
80 TABLET ORAL 2 TIMES DAILY
Qty: 90 TABLET | Refills: 2 | Status: SHIPPED | OUTPATIENT
Start: 2023-06-01

## 2023-06-01 RX ORDER — SODIUM CHLORIDE 9 MG/ML
INJECTION, SOLUTION INTRAVENOUS CONTINUOUS
Status: DISCONTINUED | OUTPATIENT
Start: 2023-06-01 | End: 2023-06-01

## 2023-06-01 RX ADMIN — Medication 40 MG: at 08:10:00

## 2023-06-01 RX ADMIN — SODIUM CHLORIDE: 9 INJECTION, SOLUTION INTRAVENOUS at 07:15:00

## 2023-06-01 NOTE — DISCHARGE INSTRUCTIONS
Home Care Instructions Following Cardioversion or ICD Evaluation    DECREASE SOTOLOL TO 80MG BID    Activity  -DO NOT drive after the procedure. You may resume driving after 24 hours  -DO NOT operate any machinery (including kitchen appliances or power tools)  -Avoid drinking alcohol for 24 hours  -You may resume your normal activity after 24 hours    What is normal  -Your skin may be red where the patches were placed  -The redness may last 2 to 3 days and feel like \"sunburn\"  -You may treat the area with an over-the-counter cream that is used for sunburned skin    Special Instructions  -If you were taking the medication Pradaxa, Coumadin, Eliquis, or Xarelto it is very important to continue taking it until your follow-up appointment with your physician    When you should NOTIFY [address]  -If you have an ICD:       ~Any time your ICD device fires       ~If you feel that you have returned to an irregular rhythm    Other  -You may resume your present diet, unless otherwise specified by your physician  -You may resume all of your medications as prescribed, unless otherwise directed by your physician  -A list of your medications was provided to you at discharge  -Please call your physician's office for a follow-up appointment.  You should be seen in 2 weeks

## 2023-06-01 NOTE — PROGRESS NOTES
Pt s/p successful synchronized cardioversion. Post procedure EKG done. VSS. Pt denied pt and neurologically intact. Tolerated PO intake and ambulation in room. IV d/c'd. Discharge instructions given-pt verbalized understanding. Pt to lobby via [facility] and wife to drive home.

## 2023-06-02 NOTE — PROCEDURES
Electrophysiology Procedure Note    [facility]ez Location: Cath Lab   CSN [id number] MRN [id number]   Admission Date 6/1/2023  Operation Date 06/01/23    Attending Physician Trell Dyson MD Operating Physician Trell Dyson MD     Pre-Operative Diagnosis: AFL    Post-Operative Diagnosis: Same as above  PROCEDURE(S) PERFORMED:    1. Cardioversion. 2.     Sedation      :  Trell Dyson MD     ANESTHESIA:  IV sedation. Moderate conscious sedation for this procedure was administered and personally monitored. Brevital 40 mg  Sedation time: 8:05-8:15 am     INDICATION:  Persistent Atrial Fibrillation     COMPLICATIONS:  None. METHODS:  The patient was brought to the outpatient cardiac telemetry unit in a fasting and nonsedated state after providing informed consent. IV sedation was administered during continuous ECG, pulse oximeter and noninvasive hemodynamic monitoring. After administering IV Brevital in intermittent boluses, the desired level of sedation was achieved. Cardioversion Energy: 100 J    Pad Position: [place]  Pre- Cardioversion Rhythm- AFL- rate 40  Post Cardioversion Rhythm - Sbrady 40    CONCLUSIONS:  1.     Successful cardioversion       Plan:  1- Rhythm/Rate Control Meds: lower Sotalol to 80 BID   2) Anticoagulation- no changes  3) Follow Up- 1 month                    Trell Dyson MD     Cardiac Electrophysiololgy  [address] [place] [address]

## 2023-08-23 ENCOUNTER — LAB ENCOUNTER (OUTPATIENT)
Dept: LAB | Age: 68
End: 2023-08-23
Attending: INTERNAL MEDICINE
Payer: MEDICARE

## 2023-08-23 DIAGNOSIS — Z01.818 PREOPERATIVE EXAMINATION, UNSPECIFIED: Primary | ICD-10-CM

## 2023-08-23 LAB
ANION GAP SERPL CALC-SCNC: 6 MMOL/L (ref 0–18)
BUN BLD-MCNC: 22 MG/DL (ref 7–18)
CALCIUM BLD-MCNC: 9.5 MG/DL (ref 8.5–10.1)
CHLORIDE SERPL-SCNC: 110 MMOL/L (ref 98–112)
CO2 SERPL-SCNC: 26 MMOL/L (ref 21–32)
CREAT BLD-MCNC: 1.04 MG/DL
EGFRCR SERPLBLD CKD-EPI 2021: 78 ML/MIN/1.73M2 (ref 60–?)
FASTING STATUS PATIENT QL REPORTED: YES
GLUCOSE BLD-MCNC: 100 MG/DL (ref 70–99)
OSMOLALITY SERPL CALC.SUM OF ELEC: 297 MOSM/KG (ref 275–295)
POTASSIUM SERPL-SCNC: 4.1 MMOL/L (ref 3.5–5.1)
SODIUM SERPL-SCNC: 142 MMOL/L (ref 136–145)

## 2023-08-23 PROCEDURE — 36415 COLL VENOUS BLD VENIPUNCTURE: CPT

## 2023-08-23 PROCEDURE — 80048 BASIC METABOLIC PNL TOTAL CA: CPT

## 2023-08-31 ENCOUNTER — HOSPITAL ENCOUNTER (OUTPATIENT)
Dept: CT IMAGING | Facility: HOSPITAL | Age: 68
Discharge: HOME | End: 2023-08-31
Attending: INTERNAL MEDICINE
Payer: MEDICARE

## 2023-08-31 VITALS — SYSTOLIC BLOOD PRESSURE: 121 MMHG | DIASTOLIC BLOOD PRESSURE: 80 MMHG | HEART RATE: 48 BPM | RESPIRATION RATE: 15 BRPM

## 2023-08-31 DIAGNOSIS — R01.1 HEART MURMUR: ICD-10-CM

## 2023-08-31 DIAGNOSIS — Z01.818 PREOP TESTING: ICD-10-CM

## 2023-08-31 DIAGNOSIS — I48.0 PAF (PAROXYSMAL ATRIAL FIBRILLATION) (HCC): ICD-10-CM

## 2023-08-31 PROCEDURE — 75574 CT ANGIO HRT W/3D IMAGE: CPT | Performed by: INTERNAL MEDICINE

## 2023-08-31 NOTE — PAT NURSING NOTE
PAT call with the patient. The following instructions were given and sent through his My Chart. He verbalized understanding. Preprocedure Instructions    Visitor Instructions    Adult Patients: 2 Adult Care Partners can accompany the patient day of procedure. 2 Care Partners may visit [phone] during inpatient stay    PreOp Instructions    You are scheduled for: a Cardiac Procedure    Date of Procedure: 09/07/23    Diet Instructions: Do not eat or drink anything after midnight    Medications: Medications you are allowed to take can be taken with a sip of water the morning of your procedure    Medications to Stop: Hold herbal supplements and vitamins    Other Medications: Do not take Eliquis and Irbesartan the morning of the procedure. (You need to be off Irbesartan for 24 hours before the procedure. If you take it in the evening, skip the dose the evening before your procedure)    Sleep Apnea: If you have sleep apnea, please bring your mask and tubing    Skin Prep: Shower with antibacterial soap using a clean washcloth, prior to procedure    Arrival Time: You will receive a call the afternoon before your procedure after 3 pm with the time you should arrive the day of your procedure    Driving After Procedure: If sedation is given, you WILL NOT be able to drive home. You will need a responsible adult  to drive you home. Discharge Teaching: Your nurse will give you specific instructions before discharge; Any questions, please call the physician's office       Park in the [facility] parking lot. Check in at the [facility] reception desk. Our  will be there to check you in for your procedure. Please bring your insurance cards and ID with you.  [email or web address] is available starting at 5 am.

## 2023-09-07 ENCOUNTER — HOSPITAL ENCOUNTER (OUTPATIENT)
Dept: INTERVENTIONAL RADIOLOGY/VASCULAR | Facility: HOSPITAL | Age: 68
Discharge: HOME | End: 2023-09-07
Attending: INTERNAL MEDICINE | Admitting: INTERNAL MEDICINE
Payer: MEDICARE

## 2023-09-07 ENCOUNTER — ANESTHESIA (OUTPATIENT)
Dept: INTERVENTIONAL RADIOLOGY/VASCULAR | Facility: HOSPITAL | Age: 68
End: 2023-09-07
Payer: MEDICARE

## 2023-09-07 VITALS
SYSTOLIC BLOOD PRESSURE: 151 MMHG | BODY MASS INDEX: 25.18 KG/M2 | DIASTOLIC BLOOD PRESSURE: 88 MMHG | TEMPERATURE: 97 F | HEIGHT: 69 IN | OXYGEN SATURATION: 93 % | RESPIRATION RATE: 17 BRPM | HEART RATE: 68 BPM | WEIGHT: 170 LBS

## 2023-09-07 DIAGNOSIS — I48.91 A-FIB (HCC): ICD-10-CM

## 2023-09-07 LAB
ATRIAL RATE: 44 BPM
ISTAT ACTIVATED CLOTTING TIME: 263 SECONDS (ref 74–137)
ISTAT ACTIVATED CLOTTING TIME: 275 SECONDS (ref 74–137)
ISTAT ACTIVATED CLOTTING TIME: 287 SECONDS (ref 74–137)
P AXIS: 50 DEGREES
P-R INTERVAL: 182 MS
Q-T INTERVAL: 504 MS
QRS DURATION: 94 MS
QTC CALCULATION (BEZET): 430 MS
R AXIS: 0 DEGREES
T AXIS: 45 DEGREES
VENTRICULAR RATE: 44 BPM

## 2023-09-07 PROCEDURE — 76942 ECHO GUIDE FOR BIOPSY: CPT | Performed by: STUDENT IN AN ORGANIZED HEALTH CARE EDUCATION/TRAINING PROGRAM

## 2023-09-07 PROCEDURE — 02K83ZZ MAP CONDUCTION MECHANISM, PERCUTANEOUS APPROACH: ICD-10-PCS | Performed by: INTERNAL MEDICINE

## 2023-09-07 PROCEDURE — 93656 COMPRE EP EVAL ABLTJ ATR FIB: CPT | Performed by: INTERNAL MEDICINE

## 2023-09-07 PROCEDURE — 02583ZZ DESTRUCTION OF CONDUCTION MECHANISM, PERCUTANEOUS APPROACH: ICD-10-PCS | Performed by: INTERNAL MEDICINE

## 2023-09-07 PROCEDURE — 93010 ELECTROCARDIOGRAM REPORT: CPT | Performed by: INTERNAL MEDICINE

## 2023-09-07 PROCEDURE — B24BZZZ ULTRASONOGRAPHY OF HEART WITH AORTA: ICD-10-PCS | Performed by: INTERNAL MEDICINE

## 2023-09-07 PROCEDURE — 4A023FZ MEASUREMENT OF CARDIAC RHYTHM, PERCUTANEOUS APPROACH: ICD-10-PCS | Performed by: INTERNAL MEDICINE

## 2023-09-07 PROCEDURE — 4A0234Z MEASUREMENT OF CARDIAC ELECTRICAL ACTIVITY, PERCUTANEOUS APPROACH: ICD-10-PCS | Performed by: INTERNAL MEDICINE

## 2023-09-07 PROCEDURE — 93655 ICAR CATH ABLTJ DSCRT ARRHYT: CPT | Performed by: INTERNAL MEDICINE

## 2023-09-07 PROCEDURE — 93005 ELECTROCARDIOGRAM TRACING: CPT

## 2023-09-07 PROCEDURE — 85347 COAGULATION TIME ACTIVATED: CPT

## 2023-09-07 RX ORDER — HEPARIN SODIUM 5000 [USP'U]/ML
INJECTION, SOLUTION INTRAVENOUS; SUBCUTANEOUS AS NEEDED
Status: DISCONTINUED | OUTPATIENT
Start: 2023-09-07 | End: 2023-09-07 | Stop reason: SURG

## 2023-09-07 RX ORDER — LABETALOL HYDROCHLORIDE 5 MG/ML
5 INJECTION, SOLUTION INTRAVENOUS EVERY 5 MIN PRN
Status: DISCONTINUED | OUTPATIENT
Start: 2023-09-07 | End: 2023-09-07 | Stop reason: HOSPADM

## 2023-09-07 RX ORDER — HYDROCODONE BITARTRATE AND ACETAMINOPHEN 5; 325 MG/1; MG/1
2 TABLET ORAL ONCE AS NEEDED
Status: DISCONTINUED | OUTPATIENT
Start: 2023-09-07 | End: 2023-09-07 | Stop reason: HOSPADM

## 2023-09-07 RX ORDER — SODIUM CHLORIDE, SODIUM LACTATE, POTASSIUM CHLORIDE, CALCIUM CHLORIDE 600; 310; 30; 20 MG/100ML; MG/100ML; MG/100ML; MG/100ML
INJECTION, SOLUTION INTRAVENOUS CONTINUOUS
Status: DISCONTINUED | OUTPATIENT
Start: 2023-09-07 | End: 2023-09-07 | Stop reason: HOSPADM

## 2023-09-07 RX ORDER — HYDROMORPHONE HYDROCHLORIDE 1 MG/ML
0.3 INJECTION, SOLUTION INTRAMUSCULAR; INTRAVENOUS; SUBCUTANEOUS EVERY 5 MIN PRN
Status: DISCONTINUED | OUTPATIENT
Start: 2023-09-07 | End: 2023-09-07 | Stop reason: HOSPADM

## 2023-09-07 RX ORDER — ACETAMINOPHEN 500 MG
1000 TABLET ORAL ONCE AS NEEDED
Status: DISCONTINUED | OUTPATIENT
Start: 2023-09-07 | End: 2023-09-07 | Stop reason: HOSPADM

## 2023-09-07 RX ORDER — PROTAMINE SULFATE 10 MG/ML
INJECTION, SOLUTION INTRAVENOUS AS NEEDED
Status: DISCONTINUED | OUTPATIENT
Start: 2023-09-07 | End: 2023-09-07 | Stop reason: SURG

## 2023-09-07 RX ORDER — HYDROMORPHONE HYDROCHLORIDE 1 MG/ML
0.4 INJECTION, SOLUTION INTRAMUSCULAR; INTRAVENOUS; SUBCUTANEOUS EVERY 5 MIN PRN
Status: DISCONTINUED | OUTPATIENT
Start: 2023-09-07 | End: 2023-09-07 | Stop reason: HOSPADM

## 2023-09-07 RX ORDER — METOPROLOL TARTRATE 5 MG/5ML
2.5 INJECTION INTRAVENOUS ONCE
Status: DISCONTINUED | OUTPATIENT
Start: 2023-09-07 | End: 2023-09-07 | Stop reason: HOSPADM

## 2023-09-07 RX ORDER — EPHEDRINE SULFATE 50 MG/ML
INJECTION INTRAVENOUS AS NEEDED
Status: DISCONTINUED | OUTPATIENT
Start: 2023-09-07 | End: 2023-09-07 | Stop reason: SURG

## 2023-09-07 RX ORDER — PROTAMINE SULFATE 10 MG/ML
INJECTION, SOLUTION INTRAVENOUS
Status: COMPLETED
Start: 2023-09-07 | End: 2023-09-07

## 2023-09-07 RX ORDER — HYDROCODONE BITARTRATE AND ACETAMINOPHEN 5; 325 MG/1; MG/1
1 TABLET ORAL ONCE AS NEEDED
Status: DISCONTINUED | OUTPATIENT
Start: 2023-09-07 | End: 2023-09-07 | Stop reason: HOSPADM

## 2023-09-07 RX ORDER — LIDOCAINE HYDROCHLORIDE 40 MG/ML
SOLUTION TOPICAL AS NEEDED
Status: DISCONTINUED | OUTPATIENT
Start: 2023-09-07 | End: 2023-09-07 | Stop reason: SURG

## 2023-09-07 RX ORDER — DEXAMETHASONE SODIUM PHOSPHATE 4 MG/ML
VIAL (ML) INJECTION AS NEEDED
Status: DISCONTINUED | OUTPATIENT
Start: 2023-09-07 | End: 2023-09-07 | Stop reason: SURG

## 2023-09-07 RX ORDER — NALOXONE HYDROCHLORIDE 0.4 MG/ML
80 INJECTION, SOLUTION INTRAMUSCULAR; INTRAVENOUS; SUBCUTANEOUS AS NEEDED
Status: DISCONTINUED | OUTPATIENT
Start: 2023-09-07 | End: 2023-09-07 | Stop reason: HOSPADM

## 2023-09-07 RX ORDER — LIDOCAINE HYDROCHLORIDE 10 MG/ML
INJECTION, SOLUTION EPIDURAL; INFILTRATION; INTRACAUDAL; PERINEURAL
Status: COMPLETED
Start: 2023-09-07 | End: 2023-09-07

## 2023-09-07 RX ORDER — HEPARIN SODIUM 1000 [USP'U]/ML
INJECTION, SOLUTION INTRAVENOUS; SUBCUTANEOUS
Status: COMPLETED
Start: 2023-09-07 | End: 2023-09-07

## 2023-09-07 RX ORDER — GLYCOPYRROLATE 0.2 MG/ML
INJECTION, SOLUTION INTRAMUSCULAR; INTRAVENOUS AS NEEDED
Status: DISCONTINUED | OUTPATIENT
Start: 2023-09-07 | End: 2023-09-07 | Stop reason: SURG

## 2023-09-07 RX ORDER — SODIUM CHLORIDE 9 MG/ML
INJECTION, SOLUTION INTRAVENOUS
Status: COMPLETED | OUTPATIENT
Start: 2023-09-08 | End: 2023-09-07

## 2023-09-07 RX ORDER — METOCLOPRAMIDE HYDROCHLORIDE 5 MG/ML
10 INJECTION INTRAMUSCULAR; INTRAVENOUS EVERY 8 HOURS PRN
Status: DISCONTINUED | OUTPATIENT
Start: 2023-09-07 | End: 2023-09-07 | Stop reason: HOSPADM

## 2023-09-07 RX ORDER — ONDANSETRON 2 MG/ML
4 INJECTION INTRAMUSCULAR; INTRAVENOUS EVERY 6 HOURS PRN
Status: DISCONTINUED | OUTPATIENT
Start: 2023-09-07 | End: 2023-09-07 | Stop reason: HOSPADM

## 2023-09-07 RX ORDER — LIDOCAINE HYDROCHLORIDE 10 MG/ML
INJECTION, SOLUTION EPIDURAL; INFILTRATION; INTRACAUDAL; PERINEURAL AS NEEDED
Status: DISCONTINUED | OUTPATIENT
Start: 2023-09-07 | End: 2023-09-07 | Stop reason: SURG

## 2023-09-07 RX ORDER — ONDANSETRON 2 MG/ML
INJECTION INTRAMUSCULAR; INTRAVENOUS AS NEEDED
Status: DISCONTINUED | OUTPATIENT
Start: 2023-09-07 | End: 2023-09-07 | Stop reason: SURG

## 2023-09-07 RX ORDER — HEPARIN SODIUM 5000 [USP'U]/ML
INJECTION, SOLUTION INTRAVENOUS; SUBCUTANEOUS
Status: COMPLETED
Start: 2023-09-07 | End: 2023-09-07

## 2023-09-07 RX ORDER — SODIUM CHLORIDE 9 MG/ML
INJECTION, SOLUTION INTRAVENOUS CONTINUOUS
OUTPATIENT
Start: 2023-09-07 | End: 2023-09-07

## 2023-09-07 RX ORDER — HYDROMORPHONE HYDROCHLORIDE 1 MG/ML
0.2 INJECTION, SOLUTION INTRAMUSCULAR; INTRAVENOUS; SUBCUTANEOUS EVERY 5 MIN PRN
Status: DISCONTINUED | OUTPATIENT
Start: 2023-09-07 | End: 2023-09-07 | Stop reason: HOSPADM

## 2023-09-07 RX ADMIN — LIDOCAINE HYDROCHLORIDE 4 ML: 40 SOLUTION TOPICAL at 07:27:00

## 2023-09-07 RX ADMIN — HEPARIN SODIUM 5000 UNITS: 5000 INJECTION, SOLUTION INTRAVENOUS; SUBCUTANEOUS at 08:00:00

## 2023-09-07 RX ADMIN — PROTAMINE SULFATE 40 MG: 10 INJECTION, SOLUTION INTRAVENOUS at 10:11:00

## 2023-09-07 RX ADMIN — EPHEDRINE SULFATE 10 MG: 50 INJECTION INTRAVENOUS at 07:48:00

## 2023-09-07 RX ADMIN — GLYCOPYRROLATE 0.2 MG: 0.2 INJECTION, SOLUTION INTRAMUSCULAR; INTRAVENOUS at 08:03:00

## 2023-09-07 RX ADMIN — HEPARIN SODIUM 5000 UNITS: 5000 INJECTION, SOLUTION INTRAVENOUS; SUBCUTANEOUS at 08:20:00

## 2023-09-07 RX ADMIN — EPHEDRINE SULFATE 10 MG: 50 INJECTION INTRAVENOUS at 08:02:00

## 2023-09-07 RX ADMIN — ONDANSETRON 4 MG: 2 INJECTION INTRAMUSCULAR; INTRAVENOUS at 10:07:00

## 2023-09-07 RX ADMIN — EPHEDRINE SULFATE 10 MG: 50 INJECTION INTRAVENOUS at 10:11:00

## 2023-09-07 RX ADMIN — LIDOCAINE HYDROCHLORIDE 50 MG: 10 INJECTION, SOLUTION EPIDURAL; INFILTRATION; INTRACAUDAL; PERINEURAL at 07:25:00

## 2023-09-07 RX ADMIN — SODIUM CHLORIDE: 9 INJECTION, SOLUTION INTRAVENOUS at 07:25:00

## 2023-09-07 RX ADMIN — HEPARIN SODIUM 1000 UNITS: 5000 INJECTION, SOLUTION INTRAVENOUS; SUBCUTANEOUS at 08:39:00

## 2023-09-07 RX ADMIN — SODIUM CHLORIDE: 9 INJECTION, SOLUTION INTRAVENOUS at 10:30:00

## 2023-09-07 RX ADMIN — HEPARIN SODIUM 1000 UNITS: 5000 INJECTION, SOLUTION INTRAVENOUS; SUBCUTANEOUS at 09:47:00

## 2023-09-07 RX ADMIN — DEXAMETHASONE SODIUM PHOSPHATE 4 MG: 4 MG/ML VIAL (ML) INJECTION at 07:25:00

## 2023-09-07 RX ADMIN — HEPARIN SODIUM 2000 UNITS: 5000 INJECTION, SOLUTION INTRAVENOUS; SUBCUTANEOUS at 09:14:00

## 2023-09-07 RX ADMIN — PROTAMINE SULFATE 10 MG: 10 INJECTION, SOLUTION INTRAVENOUS at 10:07:00

## 2023-09-07 NOTE — ANESTHESIA PROCEDURE NOTES
Airway  Date/Time: 9/7/2023 7:27 AM  Urgency: elective    Airway not difficult    General Information and Staff    Patient location during procedure: cath lab (EP)  Anesthesiologist: Adalid Escudero MD  Performed: anesthesiologist   Performed by: Adalid Escudero MD  Authorized by: Adalid Escudero MD      Indications and Patient Condition  Indications for airway management: anesthesia  Spontaneous Ventilation: absent  Sedation level: deep  Preoxygenated: yes  Patient position: sniffing  Mask difficulty assessment: 0 - not attempted    Final Airway Details  Final airway type: endotracheal airway      Successful airway: ETT  Cuffed: yes   Successful intubation technique: direct laryngoscopy  Facilitating devices/methods: intubating stylet and cricoid pressure  Endotracheal tube insertion site: oral  Blade: Kerry  Blade size: #4  ETT size (mm): 8.0    Cormack-Lehane Classification: grade IIA - partial view of glottis  Placement verified by: capnometry   Measured from: lips  ETT to lips (cm): 23  Number of attempts at approach: 1  Number of other approaches attempted: 0    Additional Comments    Teeth intact post-procedure.

## 2023-09-07 NOTE — ANESTHESIA PROCEDURE NOTES
Arterial Line    Date/Time: 9/7/2023 7:33 AM    Performed by: Mandeep Colby MD  Authorized by: Mandeep Colby MD    General Information and Staff    Procedure Start:  9/7/2023 7:33 AM  Procedure End:  9/7/2023 7:38 AM  Anesthesiologist:  Mandeep Colby MD  Performed By:  Anesthesiologist  Patient location: EP. Indication: continuous blood pressure monitoring and blood sampling needed    Site Identification: real time ultrasound guided and image stored and retrievable    Preanesthetic Checklist: 2 patient identifiers, IV checked, risks and benefits discussed, monitors and equipment checked, pre-op evaluation, timeout performed, anesthesia consent and sterile technique used    Procedure Details    Catheter Size:  20 G  Catheter Length:  1 and 3/4 inch  Catheter Type:  Arrow  Seldinger Technique?: Yes    Laterality:  Left  Site:  Radial artery  Site Prep: chlorhexidine    Line Secured:  Wrist Brace, tape and Tegaderm    Assessment    Events: patient tolerated procedure well with no complications      Medications  9/7/2023 7:33 AM      Additional Comments      Needle directly visualized entering radial artery under real-time ultrasound guidance.

## 2023-09-07 NOTE — PROCEDURES
Procedures performed:  1. Comprehensive EP study with 3-D mapping using CARTO  2. CS catheter placement to record and pace the left atrium. 3. RFA of atrial fibrillation with pulmonary vein isolation. 4. RFA of a separate and distinct arrhythmia, typical atrial flutter. 5.  Intra-cardiac echo (ICE)    : Gisel Liang MD    Indication: persistent atrial fibrillation. Complication: none    Methods: The patient was brought to the EP lab in a fasting state and non-sedated state. The right and left groins were prepped and draped in a sterile fashion. 3 sheaths were placed in the right femoral pool via the modified Seldinger technique. Catheters were placed in the appropriate position under ICE and 3D mapping. Baseline measurements were recorded and programmed stimulation from the atrium and ventricle was performed. At the conclusion of the procedure, catheters and sheaths were removed and hemostasis was achieved with Vascade closure devices. There were no apparent intra-operative complications no pericardial effusion visualized by ICE. An attune cooling balloon catheter was placed in the esophagus. The patient was transported in stable condition to recovery. Mapping and ablation: A heparin bolus was given, intermittent boluses were subsequently given to maintain an ACT of at least 300 seconds. There was no effusion at baseline. The GEMMA was visualized with ICE, there did not appear to be a thrombus in the GEMMA. Transseptal (TS) access was achieved with ICE guidance. The TS sheath was exchanged for a steerable sheath. Using the CARTO mapping system and ICE, a multi-electrode catheter was used to create a 3D geometry of the [address] Using an open irrigation RFA catheter, RFA was performed using high power short duration to achieve pulmonary vein isolation (PVI). PVI was confirmed with differential pacing. Catheters were withdrawn to the RA.  A linear set of lesions was delivered in the cavo-tricuspid isthmus until bi-directional block was confirmed. The trans-isthmus conduction time was 150 ms. Post ablation findings:   SCL 1250 ms,  ms, QRS 98 ms,  ms. AH 88 ms, HV 58 ms. CONCLUSIONS:  1. Sinus node function normal  2. AV node function normal.  3. His [place] normal  4. Status post successful pulmonary vein isolation. 5. No pericardial effusion visualized by ICE. 6. Status post successful ablation for typical cavo-tricuspid isthmus dependent atrial flutter. Bidirectional block was confirmed as described above. Atrial flutter was no longer inducible following RFA as described above.     Carmella Adair MD  [address] [place] [address]  Cardiac Electrophysiolgy  [phone]

## 2023-09-07 NOTE — ANESTHESIA PROCEDURE NOTES
Peripheral IV  Date/Time: 9/7/2023 7:30 AM  Inserted by: Trell Neff MD    Placement  Needle size: 20 G  Laterality: right  Location: arm  Site prep: alcohol  Technique: anatomical landmarks  Attempts: 1

## 2023-09-07 NOTE — DISCHARGE INSTRUCTIONS
HOME CARE INSTRUCTIONS FOLLOWING CARDIAC ABLATION (RFA) OR ELECTOPHYSIOLOGY STUDY (EPS)     ACTIVITY:  ~ DO NOT drive after the procedure. You may resume driving on Saturday.   ~ Plan on resting and relaxing tonight and tomorrow. It will be normal to tire easily for the first few days, depending on the length of your procedure and the amount of sedation you received. ~Do not lift anything over 10 pounds for the next 48 hours and 20 pounds for a week. ~Avoid sexual activity for the first 24-48 hours. ~Avoid repeated stair use and excessive walking for the first 24-48 hours. ~Avoid alcohol for the next 24 hours. ~Resume your normal activity as tolerated in 48 hours, or as instructed by your physician. WHAT IS NORMAL:  ~You may feel extra heart beats. If these beats come too often, or you feel an episode of multiple fast heart beats, notify your physician. ~The procedure sight may appear bruised or discolored. ~There may be a small amount of drainage on the bandage. ~There may be mild tenderness to the procedure site when touched. This is common. SPECIAL INSTRUCTIONS:  ~The bandage is to remain in place for 24 hours. Keep the bandage clean and dry. You may shower the next day (after 24 hours.) Do not submerge the site for 72 hours (no tub baths or pools)   ~After 24 hours, you must remove the bandage. Wash the procedure site gently with soap and water. Do not scrub. (If you chose to wear a bandage for a few days, make sure it remains clean and dry and change it daily)    WHEN YOU SHOULD NOTIFY YOUR PHYSICIAN:  ~If you have shortness of breath or a persistent cough.  ~If you have chest pains (slight discomfort in chest may be normal for 24 hours)   ~If you have persistent pain at the procedure site.    ~If you experience of a fever, temperature greater than 101, chills, infection (redness, swelling, thick yellow drainage, or a foul odor from the procedure site)    OTHER:  ~You may resume your present diet.  ~You may resume all of your medications as prescribed, unless otherwise directed by your physician. A list of medications was provided at discharge. ~Follow up with Dr Ambrosio Forbes on 10/9 at 1:00PM.   ~Resume Eliquis tonight.

## 2023-09-07 NOTE — PROGRESS NOTES
Rc'd pt from PACU s/p RFA in sta le condition. VSS. Vascade x 3 to right groin is soft, clean and dry. No bleeding or hematoma. Pt denies c/o pain or discomfort. Dr Mookie Cuadra at [facility] along with pts wife. 12:50: Pt tolerated po intake well. Bedrest completed. Pt ambulated and tolerated po intake well. Groin stable. Voided. IV removed with catheter intact. Reviewed discharge instructions with pt and wife. Verbalizes understanding. Home in stable condition without c/o. Pts wife is the .

## 2024-08-29 ENCOUNTER — ORDER TRANSCRIPTION (OUTPATIENT)
Dept: ADMINISTRATIVE | Facility: HOSPITAL | Age: 69
End: 2024-08-29

## 2024-08-29 DIAGNOSIS — Z13.6 SCREENING FOR CARDIOVASCULAR CONDITION: Primary | ICD-10-CM

## 2024-09-09 ENCOUNTER — HOSPITAL ENCOUNTER (OUTPATIENT)
Dept: CT IMAGING | Age: 69
Discharge: HOME | End: 2024-09-09
Attending: STUDENT IN AN ORGANIZED HEALTH CARE EDUCATION/TRAINING PROGRAM

## 2024-09-09 DIAGNOSIS — Z13.6 SCREENING FOR CARDIOVASCULAR CONDITION: ICD-10-CM

## 2025-04-03 ENCOUNTER — APPOINTMENT (OUTPATIENT)
Dept: GENERAL RADIOLOGY | Facility: HOSPITAL | Age: 70
End: 2025-04-03
Attending: EMERGENCY MEDICINE
Payer: MEDICARE

## 2025-04-03 ENCOUNTER — HOSPITAL ENCOUNTER (EMERGENCY)
Facility: HOSPITAL | Age: 70
Discharge: HOME | End: 2025-04-03
Attending: EMERGENCY MEDICINE
Payer: MEDICARE

## 2025-04-03 ENCOUNTER — APPOINTMENT (OUTPATIENT)
Dept: CT IMAGING | Facility: HOSPITAL | Age: 70
End: 2025-04-03
Attending: EMERGENCY MEDICINE
Payer: MEDICARE

## 2025-04-03 VITALS
HEART RATE: 58 BPM | SYSTOLIC BLOOD PRESSURE: 120 MMHG | OXYGEN SATURATION: 97 % | RESPIRATION RATE: 18 BRPM | DIASTOLIC BLOOD PRESSURE: 68 MMHG | TEMPERATURE: 97 F

## 2025-04-03 DIAGNOSIS — S52.501A CLOSED FRACTURE OF DISTAL END OF RIGHT RADIUS, UNSPECIFIED FRACTURE MORPHOLOGY, INITIAL ENCOUNTER: Primary | ICD-10-CM
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
## 2025-04-03 DIAGNOSIS — G91.9 HYDROCEPHALUS, UNSPECIFIED TYPE (HCC): ICD-10-CM

## 2025-04-03 DIAGNOSIS — S52.611A CLOSED DISPLACED FRACTURE OF STYLOID PROCESS OF RIGHT ULNA, INITIAL ENCOUNTER: ICD-10-CM

## 2025-04-03 LAB
ANION GAP SERPL CALC-SCNC: 9 MMOL/L (ref 0–18)
ATRIAL RATE: 59 BPM
BASOPHILS # BLD AUTO: 0.03 X10(3) UL (ref 0–0.2)
BASOPHILS NFR BLD AUTO: 0.4 %
BUN BLD-MCNC: 26 MG/DL (ref 9–23)
CALCIUM BLD-MCNC: 10.1 MG/DL (ref 8.7–10.6)
CHLORIDE SERPL-SCNC: 107 MMOL/L (ref 98–112)
CO2 SERPL-SCNC: 26 MMOL/L (ref 21–32)
CREAT BLD-MCNC: 0.93 MG/DL
EGFRCR SERPLBLD CKD-EPI 2021: 88 ML/MIN/1.73M2 (ref 60–?)
EOSINOPHIL # BLD AUTO: 0.1 X10(3) UL (ref 0–0.7)
EOSINOPHIL NFR BLD AUTO: 1.4 %
ERYTHROCYTE [DISTWIDTH] IN BLOOD BY AUTOMATED COUNT: 11.9 %
GLUCOSE BLD-MCNC: 109 MG/DL (ref 70–99)
HCT VFR BLD AUTO: 41 %
HGB BLD-MCNC: 14.6 G/DL
IMM GRANULOCYTES # BLD AUTO: 0.04 X10(3) UL (ref 0–1)
IMM GRANULOCYTES NFR BLD: 0.6 %
LYMPHOCYTES # BLD AUTO: 0.8 X10(3) UL (ref 1–4)
LYMPHOCYTES NFR BLD AUTO: 11.4 %
MCH RBC QN AUTO: 32.7 PG (ref 26–34)
MCHC RBC AUTO-ENTMCNC: 35.6 G/DL (ref 31–37)
MCV RBC AUTO: 91.9 FL
MONOCYTES # BLD AUTO: 0.52 X10(3) UL (ref 0.1–1)
MONOCYTES NFR BLD AUTO: 7.4 %
NEUTROPHILS # BLD AUTO: 5.54 X10 (3) UL (ref 1.5–7.7)
NEUTROPHILS # BLD AUTO: 5.54 X10(3) UL (ref 1.5–7.7)
NEUTROPHILS NFR BLD AUTO: 78.8 %
OSMOLALITY SERPL CALC.SUM OF ELEC: 299 MOSM/KG (ref 275–295)
P AXIS: 53 DEGREES
P-R INTERVAL: 158 MS
PLATELET # BLD AUTO: 165 10(3)UL (ref 150–450)
POTASSIUM SERPL-SCNC: 4 MMOL/L (ref 3.5–5.1)
Q-T INTERVAL: 458 MS
QRS DURATION: 90 MS
QTC CALCULATION (BEZET): 453 MS
R AXIS: 1 DEGREES
RBC # BLD AUTO: 4.46 X10(6)UL
SODIUM SERPL-SCNC: 142 MMOL/L (ref 136–145)
T AXIS: 51 DEGREES
TROPONIN I SERPL HS-MCNC: 9 NG/L
VENTRICULAR RATE: 59 BPM
WBC # BLD AUTO: 7 X10(3) UL (ref 4–11)

## 2025-04-03 PROCEDURE — 96361 HYDRATE IV INFUSION ADD-ON: CPT

## 2025-04-03 PROCEDURE — 93005 ELECTROCARDIOGRAM TRACING: CPT

## 2025-04-03 PROCEDURE — 80048 BASIC METABOLIC PNL TOTAL CA: CPT | Performed by: EMERGENCY MEDICINE

## 2025-04-03 PROCEDURE — 93010 ELECTROCARDIOGRAM REPORT: CPT

## 2025-04-03 PROCEDURE — 73100 X-RAY EXAM OF WRIST: CPT | Performed by: EMERGENCY MEDICINE

## 2025-04-03 PROCEDURE — 70450 CT HEAD/BRAIN W/O DYE: CPT | Performed by: EMERGENCY MEDICINE

## 2025-04-03 PROCEDURE — 96360 HYDRATION IV INFUSION INIT: CPT

## 2025-04-03 PROCEDURE — 25605 CLTX DST RDL FX/EPHYS SEP W/: CPT

## 2025-04-03 PROCEDURE — 84484 ASSAY OF TROPONIN QUANT: CPT | Performed by: EMERGENCY MEDICINE

## 2025-04-03 PROCEDURE — 99285 EMERGENCY DEPT VISIT HI MDM: CPT

## 2025-04-03 PROCEDURE — 72131 CT LUMBAR SPINE W/O DYE: CPT | Performed by: EMERGENCY MEDICINE

## 2025-04-03 PROCEDURE — 73110 X-RAY EXAM OF WRIST: CPT | Performed by: EMERGENCY MEDICINE

## 2025-04-03 PROCEDURE — 85025 COMPLETE CBC W/AUTO DIFF WBC: CPT | Performed by: EMERGENCY MEDICINE

## 2025-04-03 RX ORDER — HYDROCODONE BITARTRATE AND ACETAMINOPHEN 5; 325 MG/1; MG/1
1 TABLET ORAL EVERY 8 HOURS PRN
Qty: 12 TABLET | Refills: 0 | Status: SHIPPED | OUTPATIENT
Start: 2025-04-03 | End: 2025-04-08

## 2025-04-03 RX ORDER — HYDROCODONE BITARTRATE AND ACETAMINOPHEN 5; 325 MG/1; MG/1
1 TABLET ORAL ONCE
Status: COMPLETED | OUTPATIENT
Start: 2025-04-03 | End: 2025-04-03

## 2025-04-03 RX ORDER — BUPIVACAINE HYDROCHLORIDE 5 MG/ML
10 INJECTION, SOLUTION EPIDURAL; INTRACAUDAL; PERINEURAL ONCE
Status: COMPLETED | OUTPATIENT
Start: 2025-04-03 | End: 2025-04-03

## 2025-04-03 NOTE — ED INITIAL ASSESSMENT (HPI)
Fall from standing height, landed on R wrist. On thinners for afib, + head injury   Swelling noted to wrist. No LOC

## 2025-04-03 NOTE — ED PROVIDER NOTES
Patient Seen in: [facility] Emergency Department      History     Chief Complaint   Patient presents with    Fall     Stated Complaint:     Subjective:   HPI      71 yo M with hx of atrial fibrillation on Eliquis presenting to the ER after fall with head injury and LOC.  Patient states that he was standing on a stepstool, lost his balance, fell backwards, landed on his buttock then fell backwards, hit his head, was unable to get himself up, when he was assisted up he lost consciousness per the wife.  Patient complaining of right wrist pain.  Denies any headache, neck pain, back pain, rib pain.    Objective:     No pertinent past medical history.            No pertinent past surgical history.              No pertinent social history.                Physical Exam     ED Triage Vitals   BP 04/03/25 1312 120/68   Pulse 04/03/25 1309 58   Resp 04/03/25 1309 18   Temp 04/03/25 1309 97 °F (36.1 °C)   Temp src --    SpO2 04/03/25 1309 97 %   O2 Device 04/03/25 1309 None (Room air)       Current Vitals:   Vital Signs  BP: 120/68  Pulse: 58  Resp: 18  Temp: 97 °F (36.1 °C)    Oxygen Therapy  SpO2: 97 %  O2 Device: None (Room air)        Physical Exam  Vitals and nursing note reviewed.   Constitutional:       General: He is not in acute distress.     Appearance: He is well-developed.   HENT:      Head: Normocephalic and atraumatic.   Eyes:      Extraocular Movements: Extraocular movements intact.      Pupils: Pupils are equal, round, and reactive to light.   Neck:      Comments: No midline C-spine tenderness  Cardiovascular:      Rate and Rhythm: Normal rate and regular rhythm.      Pulses: Normal pulses.      Heart sounds: Normal heart sounds. No murmur heard.     No gallop.   Pulmonary:      Effort: Pulmonary effort is normal. No respiratory distress.      Breath sounds: Normal breath sounds.   Abdominal:      General: Abdomen is flat.      Palpations: Abdomen is soft.      Tenderness: There is no abdominal  tenderness.   Musculoskeletal:      Cervical back: Neck supple.      Comments: No midline thoracolumbar tenderness to palpation or percussion   Skin:     General: Skin is warm and dry.      Capillary Refill: Capillary refill takes less than 2 seconds.   Neurological:      General: No focal deficit present.      Mental Status: He is alert and oriented to person, place, and time.   Psychiatric:         Mood and Affect: Mood normal.         Behavior: Behavior normal.             ED Course     Labs Reviewed   CBC WITH DIFFERENTIAL WITH PLATELET - Abnormal; Notable for the following components:       Result Value    Lymphocyte Absolute 0.80 (*)     All other components within normal limits   BASIC METABOLIC PANEL (8) - Abnormal; Notable for the following components:    Glucose 109 (*)     BUN 26 (*)     Calculated Osmolality 299 (*)     All other components within normal limits   TROPONIN I HIGH SENSITIVITY - Normal       ED Course as of 04/03/25 1714  ------------------------------------------------------------  Time: 04/03 1400  Comment: Independent interpreted right wrist x-ray, comminuted impacted distal radial fracture noted.  Additional details per radiology.  ------------------------------------------------------------  Time: 04/03 1554  Comment: Reviewed CT head report, ventriculomegaly noted.  When comparing prior MRI brain in 2018, he has noted to have a large ventricles at that time as well.  Will advise neurology follow-up for continued monitoring as outpatient.  ------------------------------------------------------------  Time: 04/03 1600  Comment: PROCEDURE NOTE: ORTHOPEDIC FRACTURE REDUCTION NOTE  Local Anesthesia: local bupivicaine 0.5% 15mL hematoma block. After 10 minutes, anesthesia, achieved.      The RIGHT distal radius was reduced using traction-counter traction.  A palpable reduction was felt/audible noted.  Post reduction xrays revealed improved reduction.      ------------------------------------------------------------  Time: 04/03 1712  Comment: Decreased sensation to light touch on index finger prior to discharge.  Loosened the splint and ace wrapping.  Good circulation.  Informed of possible nerve injury with reduction or from original injury.       XR WRIST (2 VIEWS), RIGHT (CPT=73100)    Result Date: 4/3/2025  CONCLUSION:  1. Post reduction of previously described distal radial and ulnar fractures as detailed above.   LOCATION:  [facility]   Dictated by (CST): Raiza De Los Santos MD on 4/03/2025 at 4:37 PM     Finalized by (CST): Raiza De Los Santos MD on 4/03/2025 at 4:39 PM       CT SPINE LUMBAR (CPT=72131)    Result Date: 4/3/2025  CONCLUSION:   1. No acute injury or significant degenerative change of the lumbar spine.  2. Somewhat abrupt angulation of the ventral cortex at the junction of the S1/S2 segments compatible with injury of indeterminate chronicity.  Correlate with site of pain.  Ultimately, MRI may be helpful to better assess the chronicity of this finding.    LOCATION:  [id number]   Dictated by (CST): Chris Pastor MD on 4/03/2025 at 3:35 PM     Finalized by (CST): Chris Pastor MD on 4/03/2025 at 3:43 PM       CT BRAIN OR HEAD (CPT=70450)    Result Date: 4/3/2025  CONCLUSION:  1. No acute intracranial hemorrhage.  2. Mild ventriculomegaly/hydrocephalus.  3. Details as above.  Continued clinical correlation recommended.    LOCATION:  [facility]   Dictated by (CST): Raymond Howell MD on 4/03/2025 at 3:27 PM     Finalized by (CST): Raymond Howell MD on 4/03/2025 at 3:30 PM       XR WRIST COMPLETE (MIN 3 VIEWS), RIGHT (CPT=73110)    Result Date: 4/3/2025  CONCLUSION:  1. Distal radius intra-articular fracture. 2. Ulnar styloid fracture. 3. As above.    LOCATION:  [facility]   Dictated by (CST): Raymond Howell MD on 4/03/2025 at 1:58 PM     Finalized by (CST): Raymond Howell MD on 4/03/2025 at 1:59 PM             MDM      Differential diagnosis includes skull fracture, concussion, head contusion,  intracranial hemorrhage        Medical Decision Making      Disposition and Plan     Clinical Impression:  1. Closed fracture of distal end of right radius, unspecified fracture morphology, initial encounter    2. Fall, initial encounter    3. Hydrocephalus, unspecified type (HCC)    4. Closed displaced fracture of styloid process of right ulna, initial encounter         Disposition:  Discharge  4/3/2025  4:42 pm    Follow-up:  Shivam Ho MD  [address]  [facility] [address]  [facility] [address] [phone]    Follow up  Follow-up with hand surgery as discussed    [address]  [facility] [address]  [facility] [address] [phone]  Call  choose option 1 for general neurology          Medications Prescribed:  Current Discharge Medication List        START taking these medications    Details   HYDROcodone-acetaminophen 5-325 MG Oral Tab Take 1 tablet by mouth every 8 (eight) hours as needed for Pain (severe pain). Do not drink alcohol, drive, or operate heavy machinery while taking this medication.  Qty: 12 tablet, Refills: 0    Associated Diagnoses: Closed fracture of distal end of right radius, unspecified fracture morphology, initial encounter                 Supplementary Documentation:

## 2025-04-03 NOTE — DISCHARGE INSTRUCTIONS
You have an appointment Friday morning April 4, 2025 at 0820 with Dr. Kushal Morrell. The address is [address] [place], [place] [address]. The phone number is [phone]. Please be sure to bring you picture ID and insurance card.    Neurologic Instructions    Call your doctor if you have:  increased pain or headache.   trouble seeing, walking or using your arms or legs.   dizziness or passing out.   any change in behavior (agitated or sleepy).   trouble being awakened from sleep.   numbness in your face, arm or leg.   extreme weakness.   trouble talking.   nausea and vomiting.   any new or severe symptoms.    Take your medicines as prescribed. Most important, see a doctor again as discussed. If you have problems that we have not discussed, call or visit your doctor right away. If you cannot reach your doctor, return to the Emergency Department.

## 2025-04-03 NOTE — CM/SW NOTE
CM assistance requested by Dr. Valentine to assist pt with making an ortho Hand appt. CM able to schedule patient Friday morning April 4, 2025 at 0820 with Dr. Kushal Morrell. The address is [address] [place]. The phone number is [phone]. Please be sure to bring you picture ID and insurance card.

## 2025-05-13 RX ORDER — LORATADINE 10 MG/1
10 TABLET ORAL DAILY
COMMUNITY
Start: 2023-10-09

## 2025-05-13 RX ORDER — AMLODIPINE BESYLATE 5 MG/1
5 TABLET ORAL DAILY
COMMUNITY

## 2025-05-15 ENCOUNTER — HOSPITAL ENCOUNTER (OUTPATIENT)
Facility: HOSPITAL | Age: 70
Setting detail: HOSPITAL OUTPATIENT SURGERY
Discharge: HOME | End: 2025-05-15
Attending: ORTHOPAEDIC SURGERY | Admitting: ORTHOPAEDIC SURGERY
Payer: MEDICARE

## 2025-05-15 ENCOUNTER — APPOINTMENT (OUTPATIENT)
Dept: GENERAL RADIOLOGY | Facility: HOSPITAL | Age: 70
End: 2025-05-15
Attending: ORTHOPAEDIC SURGERY
Payer: MEDICARE

## 2025-05-15 ENCOUNTER — ANESTHESIA EVENT (OUTPATIENT)
Dept: SURGERY | Facility: HOSPITAL | Age: 70
End: 2025-05-15
Payer: MEDICARE

## 2025-05-15 ENCOUNTER — ANESTHESIA (OUTPATIENT)
Dept: SURGERY | Facility: HOSPITAL | Age: 70
End: 2025-05-15
Payer: MEDICARE

## 2025-05-15 VITALS
OXYGEN SATURATION: 95 % | DIASTOLIC BLOOD PRESSURE: 79 MMHG | BODY MASS INDEX: 24.88 KG/M2 | WEIGHT: 168 LBS | RESPIRATION RATE: 14 BRPM | TEMPERATURE: 98 F | SYSTOLIC BLOOD PRESSURE: 146 MMHG | HEIGHT: 69 IN | HEART RATE: 55 BPM

## 2025-05-15 PROCEDURE — 88300 SURGICAL PATH GROSS: CPT | Performed by: ORTHOPAEDIC SURGERY

## 2025-05-15 PROCEDURE — 76000 FLUOROSCOPY <1 HR PHYS/QHP: CPT | Performed by: ORTHOPAEDIC SURGERY

## 2025-05-15 RX ORDER — HYDROMORPHONE HYDROCHLORIDE 1 MG/ML
0.6 INJECTION, SOLUTION INTRAMUSCULAR; INTRAVENOUS; SUBCUTANEOUS EVERY 5 MIN PRN
Status: DISCONTINUED | OUTPATIENT
Start: 2025-05-15 | End: 2025-05-15

## 2025-05-15 RX ORDER — SODIUM CHLORIDE, SODIUM LACTATE, POTASSIUM CHLORIDE, CALCIUM CHLORIDE 600; 310; 30; 20 MG/100ML; MG/100ML; MG/100ML; MG/100ML
INJECTION, SOLUTION INTRAVENOUS CONTINUOUS
Status: DISCONTINUED | OUTPATIENT
Start: 2025-05-15 | End: 2025-05-15

## 2025-05-15 RX ORDER — HYDROMORPHONE HYDROCHLORIDE 1 MG/ML
0.2 INJECTION, SOLUTION INTRAMUSCULAR; INTRAVENOUS; SUBCUTANEOUS EVERY 5 MIN PRN
Status: DISCONTINUED | OUTPATIENT
Start: 2025-05-15 | End: 2025-05-15

## 2025-05-15 RX ORDER — MORPHINE SULFATE 4 MG/ML
2 INJECTION, SOLUTION INTRAMUSCULAR; INTRAVENOUS EVERY 10 MIN PRN
Status: DISCONTINUED | OUTPATIENT
Start: 2025-05-15 | End: 2025-05-15

## 2025-05-15 RX ORDER — ACETAMINOPHEN 500 MG
1000 TABLET ORAL ONCE
Status: COMPLETED | OUTPATIENT
Start: 2025-05-15 | End: 2025-05-15

## 2025-05-15 RX ORDER — HYDROMORPHONE HYDROCHLORIDE 1 MG/ML
0.4 INJECTION, SOLUTION INTRAMUSCULAR; INTRAVENOUS; SUBCUTANEOUS EVERY 5 MIN PRN
Status: DISCONTINUED | OUTPATIENT
Start: 2025-05-15 | End: 2025-05-15

## 2025-05-15 RX ORDER — MORPHINE SULFATE 4 MG/ML
4 INJECTION, SOLUTION INTRAMUSCULAR; INTRAVENOUS EVERY 10 MIN PRN
Status: DISCONTINUED | OUTPATIENT
Start: 2025-05-15 | End: 2025-05-15

## 2025-05-15 RX ORDER — NALOXONE HYDROCHLORIDE 0.4 MG/ML
0.08 INJECTION, SOLUTION INTRAMUSCULAR; INTRAVENOUS; SUBCUTANEOUS AS NEEDED
Status: DISCONTINUED | OUTPATIENT
Start: 2025-05-15 | End: 2025-05-15

## 2025-05-15 RX ORDER — MORPHINE SULFATE 10 MG/ML
6 INJECTION, SOLUTION INTRAMUSCULAR; INTRAVENOUS EVERY 10 MIN PRN
Status: DISCONTINUED | OUTPATIENT
Start: 2025-05-15 | End: 2025-05-15

## 2025-05-15 NOTE — ANESTHESIA PREPROCEDURE EVALUATION
Anesthesia PreOp Note    HPI:     Jesus Bush is a 70 year old male who presents for preoperative consultation requested by: Kushal Morrell MD    Date of Surgery: 5/15/2025    Procedure(s):  Removal of hardware right wrist  Indication: Retained hardware    Relevant Problems   No relevant active problems       NPO:  Last Liquid Consumption Date: 05/14/25  Last Liquid Consumption Time: 2200  Last Solid Consumption Date: 05/14/25  Last Solid Consumption Time: 2200  Last Liquid Consumption Date: 05/14/25          History Review:  Patient Active Problem List    Diagnosis Date Noted    Left ventricular diastolic dysfunction with preserved systolic function 06/14/2021    S/P aortic valve replacement 05/25/2018    Primary insomnia 05/14/2018    Vitamin D deficiency 10/12/2017    Essential hypertension 04/10/2017    Hypercholesterolemia 04/10/2017    Leukopenia, unspecified type 11/30/2016    Obstructive sleep apnea 05/12/2016    Paroxysmal atrial fibrillation (HCC) 03/09/2015       Past Medical History[1]    Past Surgical History[2]    Prescriptions Prior to Admission[3]  Current Medications and Prescriptions Ordered in Epic[4]    Allergies[5]    Family History[6]  Social Hx on file[7]    Available pre-op labs reviewed.  Lab Results   Component Value Date    WBC 7.0 04/03/2025    RBC 4.46 04/03/2025    HGB 14.6 04/03/2025    HCT 41.0 04/03/2025    MCV 91.9 04/03/2025    MCH 32.7 04/03/2025    MCHC 35.6 04/03/2025    RDW 11.9 04/03/2025    .0 04/03/2025     Lab Results   Component Value Date     04/03/2025    K 4.0 04/03/2025     04/03/2025    CO2 26.0 04/03/2025    BUN 26 (H) 04/03/2025    CREATSERUM 0.93 04/03/2025     (H) 04/03/2025    CA 10.1 04/03/2025          Vital Signs:  Body mass index is 24.81 kg/m².   height is 1.753 m (5' 9\") and weight is 76.2 kg (168 lb). His oral temperature is 97.8 °F (36.6 °C). His blood pressure is 116/74 and his pulse is 66. His oxygen saturation is 95%.    Vitals:    05/13/25 1232 05/15/25 1014   BP:  116/74   Pulse:  66   Temp:  97.8 °F (36.6 °C)   TempSrc:  Oral   SpO2:  95%   Weight: 75.8 kg (167 lb) 76.2 kg (168 lb)   Height: 1.753 m (5' 9\") 1.753 m (5' 9\")        Anesthesia Evaluation     Patient summary reviewed and Nursing notes reviewed    Airway   Mallampati: I  TM distance: >3 FB  Neck ROM: full  Dental - Dentition appears grossly intact     Pulmonary - negative ROS and normal exam    breath sounds clear to auscultation  (+) sleep apnea  Cardiovascular - negative ROS  (+) hypertension    ECG reviewed  Rhythm: regular  Rate: normal    Neuro/Psych - negative ROS   (+)  CVA,        GI/Hepatic/Renal - negative ROS     Endo/Other - negative ROS   Abdominal                  Anesthesia Plan:   ASA:  3  Plan:   MAC  Post-op Pain Management: IV analgesics  Informed Consent Plan and Risks Discussed With:  Patient      I have informed Jesus A Eleazar and/or legal guardian or family member of the nature of the anesthetic plan, benefits, risks including possible dental damage if relevant, major complications, and any alternative forms of anesthetic management.   All of the patient's questions were answered to the best of my ability. The patient desires the anesthetic management as planned.  Rickey Martini MD  5/15/2025 11:09 AM  Present on Admission:  **None**           [1]   Past Medical History:   Aortic stenosis    Moderate 9/2013    Arrhythmia    a fib    Atrial flutter, paroxysmal (HCC)    Essential hypertension    High blood pressure    High cholesterol    Hyperlipidemia    coronary calcium score equals 9 on 2/7/2014    Inguinal hernia    left, surgery scheduled    Obstructive sleep apnea (adult) (pediatric)    AHI 14 REM AHI 13 O2 Rene 84%; CPAP 10 cm    Ocular migraine    Paroxysmal atrial fibrillation (HCC)    Sleep apnea    cpap-  armstrong not use    Stroke (HCC)    Visual impairment    visual impairment   [2]   Past Surgical History:  Procedure Laterality Date     Colonoscopy  2004    diverticulosis    Colonoscopy      2014    Colonoscopy N/A 11/10/2021    Procedure: COLONOSCOPY, with polypectomy;  Surgeon: Porter Canales MD;  [place] [facility] [place]    Colonoscopy & polypectomy  09/02/2015    diverticulosis and a small polyp was removed; ASC    Colonoscopy,biopsy N/A 09/02/2015    Procedure: COLONOSCOPY, POSSIBLE BIOPSY, POSSIBLE POLYPECTOMY 41859;  Surgeon: Porter Canales MD;  Location: [facility]    Fracture surgery Right     ORIF    Hernia surgery Left 12/02/2021    I ROBOT-ASSISTED LAPAROSCOPIC LEFT INGUINAL HERNIA REPAIR WITH MESH     Ndsc ablation & rcnstj [facility] w/o bypass      Valve replacement      bioprosthetic AVR 5-18    Vasectomy     [3]   Medications Prior to Admission   Medication Sig Dispense Refill Last Dose/Taking    amLODIPine 5 MG Oral Tab Take 1 tablet (5 mg total) by mouth daily.   5/14/2025 Bedtime    loratadine (CLARITIN) 10 MG Oral Tab Take 1 tablet (10 mg total) by mouth daily.   5/15/2025 Morning    Irbesartan 150 MG Oral Tab Take 0.5 tablets (75 mg total) by mouth in the morning and 0.5 tablets (75 mg total) before bedtime. 90 tablet 3 5/15/2025 Morning    apixaban (ELIQUIS) 5 MG Oral Tab Take 1 tablet (5 mg total) by mouth 2 (two) times daily. 180 tablet 3 5/13/2025 Morning    TRAZODONE HCL 50 MG Oral Tab TAKE 3 TABLETS BY MOUTH EVERY DAY AT NIGHT 270 tablet 3 5/14/2025 Bedtime    Probiotic Product (PRO-BIOTIC BLEND OR) Take by mouth.   Past Week    aspirin 81 MG Oral Tab Take 1 tablet (81 mg total) by mouth in the morning. 90 tablet 3 5/12/2025    multivitamin Oral Tab Take 1 tablet by mouth daily. 30 tablet 0 Past Week    Vitamin C 500 MG Oral Tab Take 1 tablet (500 mg total) by mouth 3 (three) times daily. 90 tablet 0 Past Week   [4]   Current Facility-Administered Medications Ordered in Epic   Medication Dose Route Frequency Provider Last Rate Last Admin    lactated ringers infusion   Intravenous Continuous  Kushal Morrell MD 20 mL/hr at 05/15/25 1022 New Bag at 05/15/25 1022    ceFAZolin (Ancef) 2g in 10mL IV syringe premix  2 g Intravenous Once Kushal Morrell MD         No current [facility]-ordered outpatient medications on file.   [5]   Allergies  Allergen Reactions    Altace [Ramipril] Coughing    Celexa [Citalopram] OTHER (SEE COMMENTS)     Heartburn      Rozerem [Ramelteon] OTHER (SEE COMMENTS)     Drowsy     [6]   Family History  Problem Relation Age of Onset    Heart Disorder Father          age 80 myocardial infarction    Diabetes Father     Heart Disorder Mother          age 69 myocardial infarction; had cerebral aneurysm    No Known Problems Brother    [7]   Social History  Socioeconomic History    Marital status:    Tobacco Use    Smoking status: Never    Smokeless tobacco: Never   Vaping Use    Vaping status: Some Days    Substances: THC, CBD   Substance and Sexual Activity    Alcohol use: Yes     Comment: social    Drug use: Yes     Types: Cannabis

## 2025-05-15 NOTE — ANESTHESIA POSTPROCEDURE EVALUATION
Patient: Jesus Bush    Procedure Summary       Date: 05/15/25 Room / Location: [facility] MAIN OR 02 / [facility] MAIN OR    Anesthesia Start: 1120 Anesthesia Stop: 1214    Procedure: Removal of hardware right wrist (Right: Wrist) Diagnosis: (Retained hardware)    Surgeons: Kushal Morrell MD Anesthesiologist: Rickey Martini MD    Anesthesia Type: MAC ASA Status: 3            Anesthesia Type: MAC    Vitals Value Taken Time   /79 05/15/25 12:34   Temp 97.9 °F (36.6 °C) 05/15/25 12:34   Pulse 52 05/15/25 12:37   Resp 14 05/15/25 12:37   SpO2 94 % 05/15/25 12:37   Vitals shown include unfiled device data.    [facility] AN Post Evaluation:   Patient Evaluated in PACU  Patient Participation: complete - patient participated  Level of Consciousness: awake and awake and alert  Pain Score: 2  Pain Management: adequate  Airway Patency:patent  Dental exam unchanged from preop  Yes    Nausea/Vomiting: none  Cardiovascular Status: acceptable, blood pressure returned to baseline and hemodynamically stable  Respiratory Status: acceptable, unassisted and spontaneous ventilation  Postoperative Hydration stable      Rickey Martini MD  5/15/2025 12:44 PM

## 2025-05-15 NOTE — DISCHARGE INSTRUCTIONS
HOME INSTRUCTIONS  See instructions from Dr. Petros YEPEZ HOME CARE INSTRUCTIONS: POST-OP ANESTHESIA  The medication that you received for sedation or general anesthesia can last up to 24 hours. Your judgment and reflexes may be altered, even if you feel like your normal self.      We Recommend:   Do not drive any motor vehicle or bicycle   Avoid mowing the lawn, playing sports, or working with power tools/applicances (power saws, electric knives or mixers)   That you have someone stay with you on your first night home   Do not drink alcohol or take sleeping pills or tranquilizers   Do not sign legal documents within 24 hours of your procedure   If you had a nerve block for your surgery, take extra care not to put any pressure on your arm or hand for 24 hours    It is normal:  For you to have a sore throat if you had a breathing tube during surgery (while you were asleep!). The sore throat should get better within 48 hours. You can gargle with warm salt water (1/2 tsp in 4 oz warm water) or use a throat lozenge for comfort  To feel muscle aches or soreness especially in the abdomen, chest or neck. The achy feeling should go away in the next 24 hours  To feel weak, sleepy or \"wiped out\". Your should start feeling better in the next 24 hours.   To experience mild discomforts such as sore lip or tongue, headache, cramps, gas pains or a bloated feeling in your abdomen.   To experience mild back pain or soreness for a day or two if you had spinal or epidural anesthesia.   If you had laparoscopic surgery, to feel shoulder pain or discomfort on the day of surgery.   For some patients to have nausea after surgery/anesthesia    If you feel nausea or experience vomiting:   Try to move around less.   Eat less than usual or drink only liquids until the next morning   Nausea should resolve in about 24 hours    If you have a problem when you are at home:    Call your surgeons office   Discharge Instructions: After Your  Surgery  You’ve just had surgery. During surgery, you were given medicine called anesthesia to keep you relaxed and free of pain. After surgery, you may have some pain or nausea. This is common. Here are some tips for feeling better and getting well after surgery.   Going home  Your healthcare provider will show you how to take care of yourself when you go home. They'll also answer your questions. Have an adult family member or friend drive you home. For the first 24 hours after your surgery:   Don't drive or use heavy equipment.  Don't make important decisions or sign legal papers.  Take medicines as directed.  Don't drink alcohol.  Have someone stay with you, if needed. They can watch for problems and help keep you safe.  Be sure to go to all follow-up visits with your healthcare provider. And rest after your surgery for as long as your provider tells you to.   Coping with pain  If you have pain after surgery, pain medicine will help you feel better. Take it as directed, before pain becomes severe. Also, ask your healthcare provider or pharmacist about other ways to control pain. This might be with heat, ice, or relaxation. And follow any other instructions your surgeon or nurse gives you.      Stay on schedule with your medicine.     Tips for taking pain medicine  To get the best relief possible, remember these points:   Pain medicines can upset your stomach. Taking them with a little food may help.  Most pain relievers taken by mouth need at least 20 to 30 minutes to start to work.  Don't wait till your pain becomes severe before you take your medicine. Try to time your medicine so that you can take it before starting an activity. This might be before you get dressed, go for a walk, or sit down for dinner.  Constipation is a common side effect of some pain medicines. Call your healthcare provider before taking any medicines, such as laxatives or stool softeners, to help ease constipation. Also ask if you should  skip any foods. Drinking lots of fluids and eating foods, such as fruits and vegetables, that are high in fiber can also help. Remember, don't take laxatives unless your surgeon has prescribed them.  Drinking alcohol and taking pain medicine can cause dizziness and slow your breathing. It can even be deadly. Don't drink alcohol while taking pain medicine.  Pain medicine can make you react more slowly to things. Don't drive or run machinery while taking pain medicine.  Your healthcare provider may tell you to take acetaminophen to help ease your pain. Ask them how much you're supposed to take each day. Acetaminophen or other pain relievers may interact with your prescription medicines or other over-the-counter (OTC) medicines. Some prescription medicines have acetaminophen and other ingredients in them. Using both prescription and OTC acetaminophen for pain can cause you to accidentally overdose. Read the labels on your OTC medicines with care. This will help you to clearly know the list of ingredients, how much to take, and any warnings. It may also help you not take too much acetaminophen. If you have questions or don't understand the information, ask your pharmacist or healthcare provider to explain it to you before you take the OTC medicine.   Managing nausea  Some people have an upset stomach (nausea) after surgery. This is often because of anesthesia, pain, or pain medicine, less movement of food in the stomach, or the stress of surgery. These tips will help you handle nausea and eat healthy foods as you get better. If you were on a special food plan before surgery, ask your healthcare provider if you should follow it while you get better. Check with your provider on how your eating should progress. It may depend on the surgery you had. These general tips may help:   Don't push yourself to eat. Your body will tell you when to eat and how much.  Start off with clear liquids and soup. They're easier to  digest.  Next try semi-solid foods as you feel ready. These include mashed potatoes, applesauce, and gelatin.  Slowly move to solid foods. Don’t eat fatty, rich, or spicy foods at first.  Don't force yourself to have 3 large meals a day. Instead eat smaller amounts more often.  Take pain medicines with a small amount of solid food, such as crackers or toast. This helps prevent nausea.  When to call your healthcare provider  Call your healthcare provider right away if you have any of these:   You still have too much pain, or the pain gets worse, after taking the medicine. The medicine may not be strong enough. Or there may be a complication from the surgery.  You feel too sleepy, dizzy, or groggy. The medicine may be too strong.  Side effects, such as nausea or vomiting. Your healthcare provider may advise taking other medicines to treat these or may change your treatment plan..  Skin changes, such as rash, itching, or hives. This may mean you have an allergic reaction. Your provider may advise taking other medicines.  The incision looks different (for instance, part of it opens up).  Bleeding or fluid leaking from the incision site, and you weren't told to expect that.  Fever of 100.4°F (38°C) or higher, or as directed by your healthcare provider.  Call 911  Call 911 right away if you have:   Trouble breathing  Facial swelling    If you have obstructive sleep apnea   You were given anesthesia medicine during surgery to keep you comfortable and free of pain. After surgery, you may have more apnea spells because of this medicine and other medicines you were given. The spells may last longer than normal.    At home:  Keep using the continuous positive airway pressure (CPAP) device when you sleep. Unless your healthcare provider tells you not to, use it when you sleep, day or night. CPAP is a common device used to treat obstructive sleep apnea.  Talk with your provider before taking any pain medicine, muscle relaxants,  or sedatives. Your provider will tell you about the possible dangers of taking these medicines.  Contact your provider if your sleeping changes a lot even when taking medicines as directed.  [employer] last reviewed this educational content on 4/1/2024  This information is for informational purposes only. This is not intended to be a substitute for professional medical advice, diagnosis, or treatment. Always seek the advice and follow the directions from your physician or other qualified health care provider.  © 1668-1559 [employer]. All rights reserved. This information is not intended as a substitute for professional medical care. Always follow your healthcare professional's instructions.

## 2025-05-15 NOTE — H&P
History and Physical Examination    Date: 5/15/2025    Patient: Jesus Bush  Medical Record Number: [id number]  PCP: Jersey Dyer DO    Chief complaint: Retained hardware right wrist    Subjective:   Jesus Bush is a 70 year old male, who is status post an ORIF of a right distal radius fracture on 2025. The patient is here today to have a screw removed from his right wrist.      History/Other:     Past Medical History:   Diagnosis Date   Aortic stenosis 2015   Moderate 2013   Atrial flutter, paroxysmal (HCC) 2017   Essential hypertension   High cholesterol   Hyperlipidemia 10/8/2014   coronary calcium score equals 9 on 2014   Inguinal hernia   left, surgery scheduled   Obstructive sleep apnea (adult) (pediatric) PSG 11/16/15   AHI 14 REM AHI 13 O2 Rene 84%; CPAP 10 cm   Ocular migraine 10/8/2014   Paroxysmal atrial fibrillation (HCC) 3/9/2015   Stroke ([facility])   Visual impairment   visual impairment     Past Surgical History:   Procedure Laterality Date   COLONOSCOPY    diverticulosis   COLONOSCOPY      COLONOSCOPY N/A 11/10/2021   Procedure: COLONOSCOPY, with polypectomy; Surgeon: Porter Canales MD; Location: [facility]   COLONOSCOPY & POLYPECTOMY 9/2/15   diverticulosis and a small polyp was removed; ASC   COLONOSCOPY,BIOPSY N/A 2015   Procedure: COLONOSCOPY, POSSIBLE BIOPSY, POSSIBLE POLYPECTOMY 87673; Surgeon: Porter Canales MD; Location: [facility]   HERNIA SURGERY Left 2021   I ROBOT-ASSISTED LAPAROSCOPIC LEFT INGUINAL HERNIA REPAIR WITH MESH   VALVE REPLACEMENT   bioprosthetic AVR 5-18   VASECTOMY   ORIF right distal radius fracture    Family History   Problem Relation Age of Onset   Heart Disorder Father    age 80 myocardial infarction   Diabetes Father   Heart Disorder Mother    age 69 myocardial infarction; had cerebral aneurysm   No Known Problems Brother     Social History  Socioeconomic History  Marital status:    Tobacco Use  Smoking status: Never  Smokeless tobacco: Current  Vaping Use  Vaping status: Some Days  Substance and Sexual Activity  Alcohol use: Yes  Drug use: Yes  Types: Cannabis    Current Medications:  Current Outpatient Medications   Medication Sig Dispense Refill   TRAZODONE 50 MG Oral Tab TAKE 1 TABLET BY MOUTH EVERY DAY AT NIGHT 90 tablet 3   amLODIPine 5 MG Oral Tab Take 5 mg by mouth daily.   irbesartan 75 MG Oral Tab   Magnesium 300 MG Oral Cap Magnesium (oxide/AA chelate) 300 mg capsule, [RxNorm: 0]   Ergocalciferol (VITAMIN D OR) Take by mouth.   sertraline 50 MG Oral Tab Take 0.5 tablets (25 mg total) by mouth daily. 90 tablet 3   apixaban (ELIQUIS) 5 MG Oral Tab TAKE 1 TABLET BY MOUTH TWICE A  tablet 2   acetaminophen 500 MG Oral Tab Take 1,000 mg by mouth one time.   Probiotic Product (PRO-BIOTIC BLEND OR) Take by mouth.   aspirin 81 MG Oral Tab Take 1 tablet (81 mg total) by mouth daily. 90 tablet 3   multivitamin Oral Tab Take 1 tablet by mouth daily. 30 tablet 0   Vitamin C 500 MG Oral Tab Take 1 tablet (500 mg total) by mouth 3 (three) times daily. 90 tablet 0   Nutritional Supplements (GLUCOSAMINE FORTE OR) Take 1 tablet by mouth daily.       Allergies: Altace [Ramipril], Celexa [Citalopram], and Rozerem [Ramelteon]    ROS: I reviewed the patient's review of systems today. He denies any pain in his right wrist.  He denies any chest pain, shortness of breath, or fever/chills.    Objective:   Vitals: Temperature 97.8 °F, pulse 66, respiratory rate 18, blood pressure 116/74    Physical Exam   Cardiovascular: Regular rate and rhythm    Lungs: Clear to auscultation bilaterally    Abdomen: Soft, nondistended, nontender    Psychiatric: The patient is alert and oriented. He demonstrates an appropriate mood and affect.    Neurologic: The patient's sensation is intact to touch in the digits of his right hand .    Musculoskeletal: The patient is a well-nourished, well-developed male who  presents in no apparent distress. Examination of the patient's right upper extremity shows his surgical incision site in the wrist to be healed with no signs of infection or drainage. He is able to demonstrate full composite grasp and good extension in all the digits of his hand. His sensation is intact to touch and he has brisk capillary refill in all the digits of his hand. He is able to abduct and adduct his digits. He is able to demonstrate thumb opposition.         Imaging:  X-rays taken of the right wrist consisting of 3 views on 5/14/2025 show a distal radius fracture in good alignment with signs of healing. One of the distal locking screws has totally backed out and is free in the soft tissues. There is a displaced ulnar styloid fracture. 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       Assessment/Plan:   The patient is a 70 year old male with retained hardware in the right wrist who is status post an ORIF of a right distal radius fracture on 4/11/2025.     1. The risks and benefits of the surgical procedure were explained to the patient. The risks that were discussed include but are not limited to wound infection, neurovascular injury, pain/stiffness/swelling in the wrist, bleeding, and painful scar tissue formation. The patient understood the risks and benefits associated with the procedure and stated that he would like to proceed with removal of the screw from his right wrist     2. The patient understood the discussion today and agreed with the plan. All of his questions were answered.      Kushal Morrell MD

## 2025-05-15 NOTE — BRIEF OP NOTE
Pre-Operative Diagnosis: Retained hardware right wrist     Post-Operative Diagnosis: Retained hardware right wrist     Procedure Performed:   Removal of hardware right wrist    Surgeons and Role:     * Kushal Morrell MD - Primary    Assistant(s):   None     Specimen: None     Estimated Blood Loss: 10 mL      Kushal Morrell MD  5/15/2025  12:21 PM

## (undated) DEVICE — 12 ML SYRINGE LUER-LOCK TIP: Brand: MONOJECT

## (undated) DEVICE — FENESTRATED BIPOLAR FORCEPS: Brand: ENDOWRIST

## (undated) DEVICE — BATTERY

## (undated) DEVICE — SOLUTION IRRIG 1000ML 0.9% NACL USP BTL

## (undated) DEVICE — SUTURE ETHIBOND 2-0 PXX50

## (undated) DEVICE — SUTURE ETHIBOND 2-0 10X42

## (undated) DEVICE — RETROGRADE CARDIOPLEGIA CATHETER: Brand: EDWARDS LIFESCIENCES RETROGRADE CARDIOPLEGIA CATHETER

## (undated) DEVICE — ALARM PT PTCH LVL

## (undated) DEVICE — CONNECTOR PRFSN QCK PRM .25IN

## (undated) DEVICE — MEGA SUTURECUT ND: Brand: ENDOWRIST

## (undated) DEVICE — 3M™ IOBAN™ 2 ANTIMICROBIAL INCISE DRAPE 6640EZ: Brand: IOBAN™ 2

## (undated) DEVICE — SURGIFOAM

## (undated) DEVICE — SUTURE WIRE DOUBLE STERNOTOMY

## (undated) DEVICE — EZ GLIDE AORTIC CANNULA: Brand: EDWARDS LIFESCIENCES EZ GLIDE AORTIC CANNULA

## (undated) DEVICE — 3M™ TEGADERM™ TRANSPARENT FILM DRESSING, 1626W, 4 IN X 4-3/4 IN (10 CM X 12 CM), 50 EACH/CARTON, 4 CARTON/CASE: Brand: 3M™ TEGADERM™

## (undated) DEVICE — SUTURE PROLENE 4-0 BB

## (undated) DEVICE — Device

## (undated) DEVICE — CANNULA SEAL

## (undated) DEVICE — DRESSING FM 4.25X4.25IN PLMM

## (undated) DEVICE — COSEAL SURGICAL SEALANT (COSEAL) IS COMPOSED OF TWO SYNTHETIC POLYETHYLENE GLYCOLS (PEGS), A DILUTE HYDROGEN CHLORIDE SOLUTION AND A SODIUM PHOSPHATE/SODIUM CARBONATE SOLUTION. THESE COMPONENTS COME IN A KIT THAT INCLUDES AN APPLICATOR(S). AT THE TIME OF ADMINISTRATION, THE MIXED PEGS AND SOLUTIONS FORM A HYDROGEL THAT ADHERES TO TISSUE, SYNTHETIC GRAFT MATERIALS AND COVALENTLY BONDS TO ITSELF: Brand: COSEAL SURGICAL SEALANT

## (undated) DEVICE — SUTURE VLOC 90 3-0 9\" 0644

## (undated) DEVICE — COVER WAND RF DETECT

## (undated) DEVICE — COLUMN DRAPE

## (undated) DEVICE — VISUALIZATION SYSTEM: Brand: CLEARIFY

## (undated) DEVICE — SUTURE SURGICAL STEEL #7

## (undated) DEVICE — Device: Brand: LEVEL 1

## (undated) DEVICE — SUTURE SILK 1-0 SA87G

## (undated) DEVICE — DISPOSABLE TOURNIQUET CUFF SINGLE BLADDER, DUAL PORT AND QUICK CONNECT CONNECTOR: Brand: COLOR CUFF

## (undated) DEVICE — SOL  .9 1000ML BTL

## (undated) DEVICE — 40580 - THE PINK PAD - ADVANCED TRENDELENBURG POSITIONING KIT: Brand: 40580 - THE PINK PAD - ADVANCED TRENDELENBURG POSITIONING KIT

## (undated) DEVICE — CS5/5+ FASTPACK, 225ML 150U RES: Brand: HAEMONETICS CELL SAVER 5/5+ SYSTEMS

## (undated) DEVICE — SOL  .9 1000ML BAG

## (undated) DEVICE — SUTURE PROLENE 4-0 SH

## (undated) DEVICE — ANTIBACTERIAL UNDYED BRAIDED (POLYGLACTIN 910), SYNTHETIC ABSORBABLE SUTURE: Brand: COATED VICRYL

## (undated) DEVICE — SUTURE PDS II 1 CTX

## (undated) DEVICE — ZZ-CONVERTED-TO-522442- SPONGE 4X4 10PK

## (undated) DEVICE — SUTURE GUIDE

## (undated) DEVICE — SUTURE ETHIBOND 0 CT-1

## (undated) DEVICE — COVER CAMERA LIGHT HANDLE

## (undated) DEVICE — GOWN SURG AERO BLUE PERF LG

## (undated) DEVICE — SUTURE VICRYL 0 UR-6

## (undated) DEVICE — INTENT TO BE USED WITH SUTURE MATERIAL FOR TISSUE CLOSURE: Brand: RICHARD-ALLAN® NEEDLE 1/2 CIRCLE TAPER

## (undated) DEVICE — LEAD BIPOLAR TEMP 6495XF53

## (undated) DEVICE — CANNULA PRFSN 15IN 32/40FR .5

## (undated) DEVICE — INSERT SUTURE OPEN HEART

## (undated) DEVICE — BLADELESS OBTURATOR: Brand: WECK VISTA

## (undated) DEVICE — PACK CDS UPPER EXTREMITY

## (undated) DEVICE — POUCH: SSEAL TYVEK 2000/CS: Brand: MEDICAL ACTION INDUSTRIES

## (undated) DEVICE — CATH RED RUBBER 18FR

## (undated) DEVICE — ARM DRAPE

## (undated) DEVICE — CATH PRFSN 16FR 16IN LH

## (undated) DEVICE — PACK CSTM SCKR SRG PRC

## (undated) DEVICE — ADAPTER CRDPLG ANTGRD RTRGD 3W

## (undated) DEVICE — SUTURE VICRYL 1-0 J977H

## (undated) DEVICE — ABSORBABLE HEMOSTAT (OXIDIZED REGENERATED CELLULOSE, U.S.P.): Brand: SURGICEL

## (undated) DEVICE — UNDYED BRAIDED (POLYGLACTIN 910), SYNTHETIC ABSORBABLE SUTURE: Brand: COATED VICRYL

## (undated) DEVICE — NDLCTR: FOAM/ADHESIVE 10CT 96/CS: Brand: MEDICAL ACTION INDUSTRIES

## (undated) DEVICE — PADDING,UNDERCAST,COTTON, 3X4YD STERILE: Brand: MEDLINE

## (undated) DEVICE — MONOPOLAR CURVED SCISSORS: Brand: ENDOWRIST

## (undated) DEVICE — INSUFFLATION NEEDLE TO ESTABLISH PNEUMOPERITONEUM.: Brand: INSUFFLATION NEEDLE

## (undated) DEVICE — SUT COAT VCRL 0 27IN CT-1 ABSRB VLT 36MM 1/2

## (undated) DEVICE — SUTURE PROLENE 3-0 SH

## (undated) DEVICE — PACK CUSTOM VENT

## (undated) DEVICE — THORACIC CATHETER, STRAIGHT, SILICONE, WITH CLOTSTOP®: Brand: AXIOM® ATRAUM™ WITH CLOTSTOP®

## (undated) DEVICE — PACK ASSRY CUSTOM TUBING

## (undated) DEVICE — BNDG,ELSTC,MATRIX,STRL,3"X5YD,LF,HOOK&LP: Brand: MEDLINE

## (undated) DEVICE — SUTURE PROLENE 5-0 C-1

## (undated) DEVICE — HEART DRAPE & SUPPLY PACK: Brand: MEDLINE INDUSTRIES, INC.

## (undated) DEVICE — PLEDGETT SOFT 1/4 X 12

## (undated) DEVICE — PAD PLMM SLVR 12.5X4IN SLVR

## (undated) DEVICE — CANNULA PRFSN 6IN 12FR COR

## (undated) DEVICE — SUTURE PDS II 2-0 CT-1

## (undated) DEVICE — STERILE LATEX POWDER-FREE SURGICAL GLOVESWITH NITRILE COATING: Brand: PROTEXIS

## (undated) DEVICE — DRAPE SLUSH/WARMER W/DISC

## (undated) DEVICE — PROXIMATE RH ROTATING HEAD SKIN STAPLERS (35 WIDE) CONTAINS 35 STAINLESS STEEL STAPLES: Brand: PROXIMATE

## (undated) DEVICE — CONTAINER,SPECIMEN,OR STERILE,4OZ: Brand: MEDLINE

## (undated) DEVICE — APPLICATOR PREP 26ML CHG 2% ISO ALC 70%

## (undated) DEVICE — SUT ETHLN 4-0 18IN PS-2 NABSRB BLK 19MM 3/8 C

## (undated) DEVICE — 12 FOOT DISPOSABLE EXTENSION CABLE WITH SAFE CONNECT / SCREW-DOWN

## (undated) DEVICE — SUCTION CANISTER, 3000CC,SAFELINER: Brand: DEROYAL

## (undated) DEVICE — TIP COVER ACCESSORY

## (undated) DEVICE — PACK CUSTOM TUBING

## (undated) DEVICE — SUTURE MONOCRYL 3-0 Y936H

## (undated) DEVICE — ROBOTIC GENERAL: Brand: MEDLINE INDUSTRIES, INC.

## (undated) DEVICE — FORESIGHT LARGE SENSOR: Brand: FORESIGHT

## (undated) DEVICE — SUTURE VICRYL 3-0 SH

## (undated) DEVICE — CARTRIDGE HC HMS+ CRTDG SYR

## (undated) DEVICE — HEART A: Brand: MEDLINE INDUSTRIES, INC.

## (undated) DEVICE — SUTURE SILK 0 FSL

## (undated) DEVICE — STERILE POLYISOPRENE POWDER-FREE SURGICAL GLOVES: Brand: PROTEXIS

## (undated) DEVICE — STERILE (10.2 X 147CM) TELESCOPICALLY-FOLDED COVER: Brand: CIV-FLEX™ TRANSDUCER COVER

## (undated) DEVICE — EXOFIN TISSUE ADHESIVE 1.0ML

## (undated) DEVICE — CANNULA PRFSN 5.5IN 9FR AOR

## (undated) DEVICE — 3M™ STERI-DRAPE™ INSTRUMENT POUCH 1018: Brand: STERI-DRAPE™

## (undated) DEVICE — DEVICE BLWR/MSTR ACCUMIST ATCH

## (undated) NOTE — LETTER
[address] [place] [address] Rd, [place], [place]     AUTHORIZATION FOR SURGICAL OPERATION OR PROCEDURE    I hereby authorize Dr. Adis Powell MD, my Physician(s) and whomever may be designated as the doctor's Assistant, to perform the own blood, or a directed donor transfusion, I will discuss this with my Physician. 4. I consent to the photographing of procedure(s) to be performed for the purposes of advancing medicine, science and/or education, provided my identity is not revealed.  If _______________________________________________________________ ____________________________  (Witness signature)                                                                                                  (Date)                                (Time)

## (undated) NOTE — ED AVS SNAPSHOT
Sudeep Record   MRN: [id number]    Department:  [facility] Emergency Department   Date of Visit:  5/26/2018           Disclosure     Insurance plans vary and the physician(s) referred by the ER may not be covered by your plan.  Please contact y CARE PHYSICIAN AT ONCE OR RETURN IMMEDIATELY TO THE EMERGENCY DEPARTMENT. If you have been prescribed any medication(s), please fill your prescription right away and begin taking the medication(s) as directed.   If you believe that any of the medications

## (undated) NOTE — LETTER
[facility]  Administration of Anesthesia  1. I, Allie Jenkins, or _________________________________ acting on his behalf, (Patient) (Dependent/Representative) request to receive anesthesia for my pending procedure/operation/treatment.   A infections, high spinal block, spinal bleeding, seizure, cardiac arrest and death. 7. AWARENESS: I understand that it is possible (but unlikely) to have explicit memory of events from the operating room while under general anesthesia.   8. ELECTROCONVULSIV unconscious pt /Relationship    My signature below affirms that prior to the time of the procedure, I have explained to the patient and/or his/her guardian, the risks and benefits of undergoing anesthesia, as well as any reasonable alternatives.     _______

## (undated) NOTE — LETTER
[address]  [facility] [address], [place], [place]  [address]  INFORMED CONSENT FOR TRANSFUSION OF BLOOD OR BLOOD PRODUCTS  My physician has informed me of the nature, purpose, benefits and risks of transfusion for blood and blood components that ______________________________________________  (Signature of Patient)                                                            (Responsible party in case of Minor,

## (undated) NOTE — LETTER
[address], [place]  Authorization for Invasive Procedures  1.  I hereby authorize Dr. Bryce Pacheco , my physician and whomever may be designated as the doctor's assistant, to perform the following operation and/or procedure:  Thomas discuss this with my physician. 5. I consent to the photographing of the operations or procedures to be performed for the purposes of advancing medicine, science, and/or education, provided my identity is not revealed.  If the procedure has been videota ____________________________________________ Date: __________ Time: ___________    Statement of Physician  My signature below affirms that prior to the time of the procedure, I have explained to the patient and/or his legal representative, the risks and be